# Patient Record
Sex: MALE | Race: WHITE | NOT HISPANIC OR LATINO | Employment: FULL TIME | URBAN - METROPOLITAN AREA
[De-identification: names, ages, dates, MRNs, and addresses within clinical notes are randomized per-mention and may not be internally consistent; named-entity substitution may affect disease eponyms.]

---

## 2019-03-14 ENCOUNTER — APPOINTMENT (OUTPATIENT)
Dept: CT IMAGING | Facility: HOSPITAL | Age: 44
End: 2019-03-14

## 2019-03-14 ENCOUNTER — HOSPITAL ENCOUNTER (OUTPATIENT)
Facility: HOSPITAL | Age: 44
Setting detail: OBSERVATION
Discharge: HOME | End: 2019-03-16
Attending: EMERGENCY MEDICINE | Admitting: INTERNAL MEDICINE

## 2019-03-14 DIAGNOSIS — S37.012A HEMATOMA OF LEFT KIDNEY, INITIAL ENCOUNTER: Primary | ICD-10-CM

## 2019-03-14 PROBLEM — R03.0 ELEVATED BP WITHOUT DIAGNOSIS OF HYPERTENSION: Status: ACTIVE | Noted: 2019-03-14

## 2019-03-14 PROBLEM — N17.9 AKI (ACUTE KIDNEY INJURY): Status: ACTIVE | Noted: 2019-03-14

## 2019-03-14 LAB
ABO GROUP BLD: NORMAL
ALBUMIN SERPL-MCNC: 4.7 G/DL (ref 3.5–5.2)
ALBUMIN/GLOB SERPL: 1.7 G/DL
ALP SERPL-CCNC: 79 U/L (ref 39–117)
ALT SERPL W P-5'-P-CCNC: 28 U/L (ref 1–41)
ANION GAP SERPL CALCULATED.3IONS-SCNC: 13.6 MMOL/L
ANION GAP SERPL CALCULATED.3IONS-SCNC: 15.2 MMOL/L
AST SERPL-CCNC: 20 U/L (ref 1–40)
BASOPHILS # BLD AUTO: 0.05 10*3/MM3 (ref 0–0.2)
BASOPHILS NFR BLD AUTO: 0.5 % (ref 0–1.5)
BILIRUB SERPL-MCNC: 0.4 MG/DL (ref 0.1–1.2)
BILIRUB UR QL STRIP: NEGATIVE
BLD GP AB SCN SERPL QL: NEGATIVE
BUN BLD-MCNC: 34 MG/DL (ref 6–20)
BUN BLD-MCNC: 37 MG/DL (ref 6–20)
BUN/CREAT SERPL: 24.1 (ref 7–25)
BUN/CREAT SERPL: 26.1 (ref 7–25)
CALCIUM SPEC-SCNC: 9.1 MG/DL (ref 8.6–10.5)
CALCIUM SPEC-SCNC: 9.6 MG/DL (ref 8.6–10.5)
CHLORIDE SERPL-SCNC: 100 MMOL/L (ref 98–107)
CHLORIDE SERPL-SCNC: 99 MMOL/L (ref 98–107)
CLARITY UR: CLEAR
CO2 SERPL-SCNC: 22.4 MMOL/L (ref 22–29)
CO2 SERPL-SCNC: 23.8 MMOL/L (ref 22–29)
COLOR UR: YELLOW
CREAT BLD-MCNC: 1.41 MG/DL (ref 0.76–1.27)
CREAT BLD-MCNC: 1.42 MG/DL (ref 0.76–1.27)
DEPRECATED RDW RBC AUTO: 37.7 FL (ref 37–54)
DEPRECATED RDW RBC AUTO: 38.4 FL (ref 37–54)
EOSINOPHIL # BLD AUTO: 0.19 10*3/MM3 (ref 0–0.4)
EOSINOPHIL NFR BLD AUTO: 1.9 % (ref 0.3–6.2)
ERYTHROCYTE [DISTWIDTH] IN BLOOD BY AUTOMATED COUNT: 12.5 % (ref 12.3–15.4)
ERYTHROCYTE [DISTWIDTH] IN BLOOD BY AUTOMATED COUNT: 12.6 % (ref 12.3–15.4)
GFR SERPL CREATININE-BSD FRML MDRD: 54 ML/MIN/1.73
GFR SERPL CREATININE-BSD FRML MDRD: 54 ML/MIN/1.73
GLOBULIN UR ELPH-MCNC: 2.7 GM/DL
GLUCOSE BLD-MCNC: 109 MG/DL (ref 65–99)
GLUCOSE BLD-MCNC: 112 MG/DL (ref 65–99)
GLUCOSE UR STRIP-MCNC: NEGATIVE MG/DL
HCT VFR BLD AUTO: 42.6 % (ref 37.5–51)
HCT VFR BLD AUTO: 45.1 % (ref 37.5–51)
HGB BLD-MCNC: 14.5 G/DL (ref 13–17.7)
HGB BLD-MCNC: 15.4 G/DL (ref 13–17.7)
HGB UR QL STRIP.AUTO: NEGATIVE
HOLD SPECIMEN: NORMAL
HOLD SPECIMEN: NORMAL
IMM GRANULOCYTES # BLD AUTO: 0.1 10*3/MM3 (ref 0–0.05)
IMM GRANULOCYTES NFR BLD AUTO: 1 % (ref 0–0.5)
KETONES UR QL STRIP: NEGATIVE
LEUKOCYTE ESTERASE UR QL STRIP.AUTO: NEGATIVE
LYMPHOCYTES # BLD AUTO: 1.24 10*3/MM3 (ref 0.7–3.1)
LYMPHOCYTES NFR BLD AUTO: 12.5 % (ref 19.6–45.3)
MCH RBC QN AUTO: 28.2 PG (ref 26.6–33)
MCH RBC QN AUTO: 28.4 PG (ref 26.6–33)
MCHC RBC AUTO-ENTMCNC: 34 G/DL (ref 31.5–35.7)
MCHC RBC AUTO-ENTMCNC: 34.1 G/DL (ref 31.5–35.7)
MCV RBC AUTO: 82.6 FL (ref 79–97)
MCV RBC AUTO: 83.5 FL (ref 79–97)
MONOCYTES # BLD AUTO: 0.79 10*3/MM3 (ref 0.1–0.9)
MONOCYTES NFR BLD AUTO: 7.9 % (ref 5–12)
NEUTROPHILS # BLD AUTO: 7.57 10*3/MM3 (ref 1.4–7)
NEUTROPHILS NFR BLD AUTO: 76.2 % (ref 42.7–76)
NITRITE UR QL STRIP: NEGATIVE
NRBC BLD AUTO-RTO: 0 /100 WBC (ref 0–0)
PH UR STRIP.AUTO: 6 [PH] (ref 5–8)
PLATELET # BLD AUTO: 185 10*3/MM3 (ref 140–450)
PLATELET # BLD AUTO: 224 10*3/MM3 (ref 140–450)
PMV BLD AUTO: 8.8 FL (ref 6–12)
PMV BLD AUTO: 9 FL (ref 6–12)
POTASSIUM BLD-SCNC: 4 MMOL/L (ref 3.5–5.2)
POTASSIUM BLD-SCNC: 4.3 MMOL/L (ref 3.5–5.2)
PROT SERPL-MCNC: 7.4 G/DL (ref 6–8.5)
PROT UR QL STRIP: NEGATIVE
RBC # BLD AUTO: 5.1 10*6/MM3 (ref 4.14–5.8)
RBC # BLD AUTO: 5.46 10*6/MM3 (ref 4.14–5.8)
RH BLD: POSITIVE
SODIUM BLD-SCNC: 136 MMOL/L (ref 136–145)
SODIUM BLD-SCNC: 138 MMOL/L (ref 136–145)
SP GR UR STRIP: 1.02 (ref 1–1.03)
T&S EXPIRATION DATE: NORMAL
UROBILINOGEN UR QL STRIP: NORMAL
WBC NRBC COR # BLD: 7.71 10*3/MM3 (ref 3.4–10.8)
WBC NRBC COR # BLD: 9.94 10*3/MM3 (ref 3.4–10.8)
WHOLE BLOOD HOLD SPECIMEN: NORMAL
WHOLE BLOOD HOLD SPECIMEN: NORMAL

## 2019-03-14 PROCEDURE — G0378 HOSPITAL OBSERVATION PER HR: HCPCS

## 2019-03-14 PROCEDURE — 86900 BLOOD TYPING SEROLOGIC ABO: CPT | Performed by: EMERGENCY MEDICINE

## 2019-03-14 PROCEDURE — 74160 CT ABDOMEN W/CONTRAST: CPT

## 2019-03-14 PROCEDURE — 74176 CT ABD & PELVIS W/O CONTRAST: CPT

## 2019-03-14 PROCEDURE — 25010000002 ONDANSETRON PER 1 MG: Performed by: EMERGENCY MEDICINE

## 2019-03-14 PROCEDURE — 25010000002 IOPAMIDOL 61 % SOLUTION: Performed by: HOSPITALIST

## 2019-03-14 PROCEDURE — 96361 HYDRATE IV INFUSION ADD-ON: CPT

## 2019-03-14 PROCEDURE — 81003 URINALYSIS AUTO W/O SCOPE: CPT | Performed by: EMERGENCY MEDICINE

## 2019-03-14 PROCEDURE — 80048 BASIC METABOLIC PNL TOTAL CA: CPT | Performed by: HOSPITALIST

## 2019-03-14 PROCEDURE — 85025 COMPLETE CBC W/AUTO DIFF WBC: CPT | Performed by: EMERGENCY MEDICINE

## 2019-03-14 PROCEDURE — 25010000002 HYDROMORPHONE PER 4 MG: Performed by: NURSE PRACTITIONER

## 2019-03-14 PROCEDURE — 80053 COMPREHEN METABOLIC PANEL: CPT | Performed by: EMERGENCY MEDICINE

## 2019-03-14 PROCEDURE — 85027 COMPLETE CBC AUTOMATED: CPT | Performed by: HOSPITALIST

## 2019-03-14 PROCEDURE — 86850 RBC ANTIBODY SCREEN: CPT | Performed by: EMERGENCY MEDICINE

## 2019-03-14 PROCEDURE — 96374 THER/PROPH/DIAG INJ IV PUSH: CPT

## 2019-03-14 PROCEDURE — 86901 BLOOD TYPING SEROLOGIC RH(D): CPT | Performed by: EMERGENCY MEDICINE

## 2019-03-14 PROCEDURE — 36415 COLL VENOUS BLD VENIPUNCTURE: CPT | Performed by: HOSPITALIST

## 2019-03-14 PROCEDURE — 99284 EMERGENCY DEPT VISIT MOD MDM: CPT

## 2019-03-14 PROCEDURE — 96376 TX/PRO/DX INJ SAME DRUG ADON: CPT

## 2019-03-14 PROCEDURE — 96375 TX/PRO/DX INJ NEW DRUG ADDON: CPT

## 2019-03-14 PROCEDURE — 25010000002 KETOROLAC TROMETHAMINE PER 15 MG: Performed by: EMERGENCY MEDICINE

## 2019-03-14 PROCEDURE — 25010000002 HYDROMORPHONE PER 4 MG: Performed by: EMERGENCY MEDICINE

## 2019-03-14 RX ORDER — SODIUM CHLORIDE 9 MG/ML
100 INJECTION, SOLUTION INTRAVENOUS CONTINUOUS
Status: DISCONTINUED | OUTPATIENT
Start: 2019-03-14 | End: 2019-03-15

## 2019-03-14 RX ORDER — BUSPIRONE HYDROCHLORIDE 10 MG/1
10 TABLET ORAL EVERY 12 HOURS
Status: DISCONTINUED | OUTPATIENT
Start: 2019-03-14 | End: 2019-03-16 | Stop reason: HOSPADM

## 2019-03-14 RX ORDER — NITROGLYCERIN 0.4 MG/1
0.4 TABLET SUBLINGUAL
Status: DISCONTINUED | OUTPATIENT
Start: 2019-03-14 | End: 2019-03-16 | Stop reason: HOSPADM

## 2019-03-14 RX ORDER — ONDANSETRON 2 MG/ML
4 INJECTION INTRAMUSCULAR; INTRAVENOUS EVERY 4 HOURS PRN
Status: DISCONTINUED | OUTPATIENT
Start: 2019-03-14 | End: 2019-03-16 | Stop reason: HOSPADM

## 2019-03-14 RX ORDER — FLUOXETINE HYDROCHLORIDE 20 MG/1
40 CAPSULE ORAL DAILY
Status: DISCONTINUED | OUTPATIENT
Start: 2019-03-14 | End: 2019-03-16 | Stop reason: HOSPADM

## 2019-03-14 RX ORDER — NALOXONE HCL 0.4 MG/ML
0.4 VIAL (ML) INJECTION
Status: DISCONTINUED | OUTPATIENT
Start: 2019-03-14 | End: 2019-03-16 | Stop reason: HOSPADM

## 2019-03-14 RX ORDER — ONDANSETRON 2 MG/ML
4 INJECTION INTRAMUSCULAR; INTRAVENOUS ONCE
Status: COMPLETED | OUTPATIENT
Start: 2019-03-14 | End: 2019-03-14

## 2019-03-14 RX ORDER — FLUOXETINE HYDROCHLORIDE 40 MG/1
40 CAPSULE ORAL DAILY
COMMUNITY

## 2019-03-14 RX ORDER — HYDROMORPHONE HYDROCHLORIDE 1 MG/ML
0.5 INJECTION, SOLUTION INTRAMUSCULAR; INTRAVENOUS; SUBCUTANEOUS ONCE
Status: COMPLETED | OUTPATIENT
Start: 2019-03-14 | End: 2019-03-14

## 2019-03-14 RX ORDER — SODIUM CHLORIDE 0.9 % (FLUSH) 0.9 %
10 SYRINGE (ML) INJECTION AS NEEDED
Status: DISCONTINUED | OUTPATIENT
Start: 2019-03-14 | End: 2019-03-16 | Stop reason: HOSPADM

## 2019-03-14 RX ORDER — BUPROPION HYDROCHLORIDE 75 MG/1
150 TABLET ORAL DAILY
Status: DISCONTINUED | OUTPATIENT
Start: 2019-03-14 | End: 2019-03-16 | Stop reason: HOSPADM

## 2019-03-14 RX ORDER — HYDROCODONE BITARTRATE AND ACETAMINOPHEN 7.5; 325 MG/1; MG/1
1 TABLET ORAL EVERY 4 HOURS PRN
Status: DISCONTINUED | OUTPATIENT
Start: 2019-03-14 | End: 2019-03-15

## 2019-03-14 RX ORDER — SODIUM CHLORIDE 0.9 % (FLUSH) 0.9 %
3-10 SYRINGE (ML) INJECTION AS NEEDED
Status: DISCONTINUED | OUTPATIENT
Start: 2019-03-14 | End: 2019-03-16 | Stop reason: HOSPADM

## 2019-03-14 RX ORDER — SODIUM CHLORIDE 0.9 % (FLUSH) 0.9 %
3 SYRINGE (ML) INJECTION EVERY 12 HOURS SCHEDULED
Status: DISCONTINUED | OUTPATIENT
Start: 2019-03-14 | End: 2019-03-16 | Stop reason: HOSPADM

## 2019-03-14 RX ORDER — HYDROMORPHONE HYDROCHLORIDE 1 MG/ML
0.5 INJECTION, SOLUTION INTRAMUSCULAR; INTRAVENOUS; SUBCUTANEOUS
Status: DISCONTINUED | OUTPATIENT
Start: 2019-03-14 | End: 2019-03-16 | Stop reason: HOSPADM

## 2019-03-14 RX ORDER — KETOROLAC TROMETHAMINE 30 MG/ML
30 INJECTION, SOLUTION INTRAMUSCULAR; INTRAVENOUS ONCE
Status: COMPLETED | OUTPATIENT
Start: 2019-03-14 | End: 2019-03-14

## 2019-03-14 RX ORDER — ACETAMINOPHEN 325 MG/1
650 TABLET ORAL EVERY 6 HOURS PRN
Status: DISCONTINUED | OUTPATIENT
Start: 2019-03-14 | End: 2019-03-16 | Stop reason: HOSPADM

## 2019-03-14 RX ADMIN — FLUOXETINE HYDROCHLORIDE 40 MG: 20 CAPSULE ORAL at 15:22

## 2019-03-14 RX ADMIN — HYDROMORPHONE HYDROCHLORIDE 0.5 MG: 1 INJECTION, SOLUTION INTRAMUSCULAR; INTRAVENOUS; SUBCUTANEOUS at 06:20

## 2019-03-14 RX ADMIN — HYDROMORPHONE HYDROCHLORIDE 0.5 MG: 1 INJECTION, SOLUTION INTRAMUSCULAR; INTRAVENOUS; SUBCUTANEOUS at 10:05

## 2019-03-14 RX ADMIN — BUSPIRONE HYDROCHLORIDE 10 MG: 10 TABLET ORAL at 22:03

## 2019-03-14 RX ADMIN — SODIUM CHLORIDE 100 ML/HR: 9 INJECTION, SOLUTION INTRAVENOUS at 20:17

## 2019-03-14 RX ADMIN — HYDROMORPHONE HYDROCHLORIDE 0.5 MG: 1 INJECTION, SOLUTION INTRAMUSCULAR; INTRAVENOUS; SUBCUTANEOUS at 15:20

## 2019-03-14 RX ADMIN — SODIUM CHLORIDE 100 ML/HR: 9 INJECTION, SOLUTION INTRAVENOUS at 10:05

## 2019-03-14 RX ADMIN — KETOROLAC TROMETHAMINE 30 MG: 30 INJECTION, SOLUTION INTRAMUSCULAR; INTRAVENOUS at 05:21

## 2019-03-14 RX ADMIN — HYDROMORPHONE HYDROCHLORIDE 0.5 MG: 1 INJECTION, SOLUTION INTRAMUSCULAR; INTRAVENOUS; SUBCUTANEOUS at 19:25

## 2019-03-14 RX ADMIN — HYDROMORPHONE HYDROCHLORIDE 0.5 MG: 1 INJECTION, SOLUTION INTRAMUSCULAR; INTRAVENOUS; SUBCUTANEOUS at 13:24

## 2019-03-14 RX ADMIN — BUPROPION HYDROCHLORIDE 150 MG: 75 TABLET, FILM COATED ORAL at 15:22

## 2019-03-14 RX ADMIN — IOPAMIDOL 85 ML: 612 INJECTION, SOLUTION INTRAVENOUS at 07:26

## 2019-03-14 RX ADMIN — ONDANSETRON 4 MG: 2 INJECTION INTRAMUSCULAR; INTRAVENOUS at 05:20

## 2019-03-14 RX ADMIN — SODIUM CHLORIDE, PRESERVATIVE FREE 3 ML: 5 INJECTION INTRAVENOUS at 21:00

## 2019-03-14 RX ADMIN — SODIUM CHLORIDE, PRESERVATIVE FREE 10 ML: 5 INJECTION INTRAVENOUS at 10:07

## 2019-03-14 RX ADMIN — HYDROMORPHONE HYDROCHLORIDE 0.5 MG: 1 INJECTION, SOLUTION INTRAMUSCULAR; INTRAVENOUS; SUBCUTANEOUS at 22:53

## 2019-03-14 NOTE — ED NOTES
Attempted to call report to 6E. RN not available at this time.     Shante Neumann, ESA  03/14/19 0662

## 2019-03-14 NOTE — ED NOTES
"Pt c/o LLQ ABD pain since 0100 this morning. Denies any vomiting or diarrhea. C/o some nausea. Denies any urinary symptoms, but states hx of kidney stones and \"this feels similar.\" Denies any GI hx. States took a Norco when the pain started without relief. States was playing ice hockey and \"I got hit pretty hard\" around 0000 tonight. States \"I got the wind knocked out of me.\" Denies hitting head or taking blood thinners. Denies any other pain or injury.    VSS, NAD, A&O x4. Respirations even and unlabored. Denies any other acute complaints. Call light in reach. Will continue to monitor. MD to treat.     Shante Neumann, RN  03/14/19 9150       Shante Neumann, RN  03/14/19 0509    "

## 2019-03-14 NOTE — CONSULTS
FIRST UROLOGY CONSULT      Patient Identification:  NAME:  Navin Hare  Age:  43 y.o.   Sex:  male   :  1975   MRN:  [id number]       Chief complaint: Bleeding on the left kidney I have a history of kidney stones    History of present illness: 43-year-old white male history of stones on the right side was playing hockey last night fell over the boards and falling on his left side at first thought there was not much going on as far as pain became progressively severe was in the emergency room hypertensive with flank pain up to about 8-9 out of 10 with a CT scan confirming a subcapsular hematoma on noncontrasted study later films show that the hematoma was drained and made unchanged and classified as a class I he has had no recent stones his urine has been clear voiding without difficulty      Past medical history:  Past Medical History:   Diagnosis Date   • Kidney stones        Past surgical history:  Past Surgical History:   Procedure Laterality Date   • EAR TUBES         Allergies:  Patient has no known allergies.    Home medications:  Medications Prior to Admission   Medication Sig Dispense Refill Last Dose   • BUPROPION HCL PO Take 150 mg by mouth Daily.      • BUSPIRONE HCL PO Take 10 mg by mouth Every 12 (Twelve) Hours.      • FLUoxetine (PROzac) 40 MG capsule Take 40 mg by mouth Daily.           Hospital medications:    buPROPion 150 mg Oral Daily   busPIRone 10 mg Oral Q12H   FLUoxetine 40 mg Oral Daily   sodium chloride 3 mL Intravenous Q12H       sodium chloride 100 mL/hr Last Rate: 100 mL/hr (19 1005)     •  acetaminophen  •  HYDROcodone-acetaminophen  •  HYDROmorphone **AND** naloxone  •  magnesium hydroxide  •  nitroglycerin  •  ondansetron  •  [COMPLETED] Insert peripheral IV **AND** sodium chloride  •  sodium chloride    Family history:  History reviewed. No pertinent family history.    Social history:  Social History     Tobacco Use   • Smoking status: Never Smoker   •  Smokeless tobacco: Never Used   Substance Use Topics   • Alcohol use: Yes     Alcohol/week: 1.2 oz     Types: 2 Cans of beer per week   • Drug use: No       Review of systems:    Negative 12-system ROS except for the following: No fever or chills no gross hematuria      Objective:  TMax 24 hours:   Temp (24hrs), Av.9 °F (36.1 °C), Min:96.7 °F (35.9 °C), Max:97.1 °F (36.2 °C)      Vitals Ranges:   Temp:  [96.7 °F (35.9 °C)-97.1 °F (36.2 °C)] 97.1 °F (36.2 °C)  Heart Rate:  [73-90] 80  Resp:  [16] 16  BP: (140-168)/() 165/95    Intake/Output Last 3 shifts:  No intake/output data recorded.     Physical Exam:       General Appearance:    Alert, cooperative, in no acute distress   Head:    Normocephalic, without obvious abnormality, atraumatic   Eyes:           conjunctivae and corneas clear   Ears:    Normal external inspection   Throat:   No oral lesions, oral mucosa moist   Neck:   Supple, no LAD, trachea midline   Back:     No CVA tenderness   Lungs:     Respirations unlabored, symmetric excursion    Heart:    RRR, intact peripheral pulses   Abdomen:     Soft, NDNT, no masses, no guarding  LT CVAT   :     HERBERTH:  Extremities:     No edema, no deformity   Skin:   No bleeding, bruising or rashes   Neuro/Psych:   Orientation intact, mood/affect pleasant, no focal findings       Results review:   I reviewed the patient's new clinical results.    Data review:  Lab Results (last 24 hours)     Procedure Component Value Units Date/Time    CBC (No Diff) [[id number]]  (Normal) Collected:  19    Specimen:  Blood Updated:  19     WBC 7.71 10*3/mm3      RBC 5.10 10*6/mm3      Hemoglobin 14.5 g/dL      Hematocrit 42.6 %      MCV 83.5 fL      MCH 28.4 pg      MCHC 34.0 g/dL      RDW 12.6 %      RDW-SD 38.4 fl      MPV 8.8 fL      Platelets 185 10*3/mm3     Basic Metabolic Panel [[id number]] Collected:  19 120    Specimen:  Blood Updated:  19 1212    [place] Draw [[id number]] Collected:   03/14/19 0515    Specimen:  Blood Updated:  03/14/19 0616    Narrative:       The following orders were created for panel order [place] Draw.  Procedure                               Abnormality         Status                     ---------                               -----------         ------                     Light Blue Top[[id number]]                                    Final result               Green Top (Gel)[[id number]]                                   Final result               Lavender Top[[id number]]                                      Final result               Gold Top - SST[[id number]]                                   Final result                 Please view results for these tests on the individual orders.    Light Blue Top [[id number]] Collected:  03/14/19 0515    Specimen:  Blood Updated:  03/14/19 0616     Extra Tube hold for add-on     Comment: Auto resulted       Green Top (Gel) [[id number]] Collected:  03/14/19 0515    Specimen:  Blood Updated:  03/14/19 0616     Extra Tube Hold for add-ons.     Comment: Auto resulted.       Lavender Top [[id number]] Collected:  03/14/19 0515    Specimen:  Blood Updated:  03/14/19 0616     Extra Tube hold for add-on     Comment: Auto resulted       Gold Top - SST [[id number]] Collected:  03/14/19 0515    Specimen:  Blood Updated:  03/14/19 0616     Extra Tube Hold for add-ons.     Comment: Auto resulted.       Comprehensive Metabolic Panel [[id number]]  (Abnormal) Collected:  03/14/19 0515    Specimen:  Blood Updated:  03/14/19 0549     Glucose 109 mg/dL      BUN 37 mg/dL      Creatinine 1.42 mg/dL      Sodium 138 mmol/L      Potassium 4.0 mmol/L      Chloride 99 mmol/L      CO2 23.8 mmol/L      Calcium 9.6 mg/dL      Total Protein 7.4 g/dL      Albumin 4.70 g/dL      ALT (SGPT) 28 U/L      AST (SGOT) 20 U/L      Alkaline Phosphatase 79 U/L      Total Bilirubin 0.4 mg/dL      eGFR Non African Amer 54 mL/min/1.73      Globulin 2.7 gm/dL      A/G Ratio 1.7 g/dL       BUN/Creatinine Ratio 26.1     Anion Gap 15.2 mmol/L     Narrative:       GFR Normal >60  Chronic Kidney Disease <60  Kidney Failure <15    Urinalysis With Microscopic If Indicated (No Culture) - Urine, Clean Catch [[id number]]  (Normal) Collected:  03/14/19 0516    Specimen:  Urine, Clean Catch Updated:  03/14/19 0528     Color, UA Yellow     Appearance, UA Clear     pH, UA 6.0     Specific Gravity, UA 1.024     Glucose, UA Negative     Ketones, UA Negative     Bilirubin, UA Negative     Blood, UA Negative     Protein, UA Negative     Leuk Esterase, UA Negative     Nitrite, UA Negative     Urobilinogen, UA 0.2 E.U./dL    Narrative:       Urine microscopic not indicated.    CBC & Differential [[id number]] Collected:  03/14/19 0515    Specimen:  Blood Updated:  03/14/19 0526    Narrative:       The following orders were created for panel order CBC & Differential.  Procedure                               Abnormality         Status                     ---------                               -----------         ------                     CBC Auto Differential[[id number]]         Abnormal            Final result                 Please view results for these tests on the individual orders.    CBC Auto Differential [[id number]]  (Abnormal) Collected:  03/14/19 0515    Specimen:  Blood Updated:  03/14/19 0526     WBC 9.94 10*3/mm3      RBC 5.46 10*6/mm3      Hemoglobin 15.4 g/dL      Hematocrit 45.1 %      MCV 82.6 fL      MCH 28.2 pg      MCHC 34.1 g/dL      RDW 12.5 %      RDW-SD 37.7 fl      MPV 9.0 fL      Platelets 224 10*3/mm3      Neutrophil % 76.2 %      Lymphocyte % 12.5 %      Monocyte % 7.9 %      Eosinophil % 1.9 %      Basophil % 0.5 %      Immature Grans % 1.0 %      Neutrophils, Absolute 7.57 10*3/mm3      Lymphocytes, Absolute 1.24 10*3/mm3      Monocytes, Absolute 0.79 10*3/mm3      Eosinophils, Absolute 0.19 10*3/mm3      Basophils, Absolute 0.05 10*3/mm3      Immature Grans, Absolute 0.10 10*3/mm3      nRBC 0.0  /100 WBC            Imaging:  Imaging Results (last 24 hours)     Procedure Component Value Units Date/Time    CT Abdomen With Contrast [[id number]] Collected:  03/14/19 0743     Updated:  03/14/19 0800    Narrative:       CT ABDOMEN W CONTRAST-     Radiation dose reduction techniques were utilized, including automated  exposure control and exposure modulation based on body size.     Clinical: Blunt trauma left abdomen with left subcapsular renal  hematoma, follow-up     FINDINGS: There is a left renal subcapsular hematoma again demonstrated.  It extends from the superior to the inferior pole along the posterior  border and measures 6.4 cm transverse and 2.7 cm AP. Size is similar to  the previous examination. There is a small amount of left-sided  perinephric fluid with fat stranding similar to the previous  examination. Slightly diminished left nephrogram. There is no indication  of active hemorrhage. The left adrenal gland is typical in appearance.  No abnormality of the spleen or left lower ribs. The left lung base is  typical in appearance.     There is a tiny 2-3 mm hepatic cyst within the right lobe of the liver.  The liver is otherwise normal in appearance. The gallbladder is  satisfactory in appearance. The pancreas is normal. There is a sizable  duodenal diverticulum adjacent to the pancreatic head, measuring 4 cm.  Stomach is largely collapsed overall contour within normal limits. The  right adrenal gland is normal. The caliber of the aorta is normal. 9 mm  right renal cortical cyst. The right kidney is otherwise satisfactory in  appearance. The appendix and visualized bowel are within normal limits.  Periumbilical hernia noted, it contains only mesenteric fat.     CONCLUSION: Size of the left subcapsular renal hematoma is similar to  the earlier examination performed at 0528 hours. Perinephric fluid/fat  stranding is likewise stable. Slightly delayed left nephrogram compared  to the right. No abnormality  of the spleen, left lung base or left lower  ribs.        This report was finalized on 3/14/2019 7:56 AM by Dr. Demetri Burgess M.D.       CT Abdomen Pelvis Without Contrast [[id number]] Collected:  03/14/19 0544     Updated:  03/14/19 0553    Narrative:       NONCONTRAST CT SCANS ABDOMEN AND PELVIS     HISTORY: llq pain     COMPARISON: 5/22/2016.     TECHNIQUE:Radiation dose reduction techniques were utilized, including  automated exposure control and exposure modulation based on body size.   Axial images were obtained from the lung bases to the symphysis pubis  without oral or IV contrast per request.      FINDINGS:  There is a stable 4 mm noncalcified right lower lobe nodule  laterally on image 12. This most likely reflects a tiny granuloma,  particularly given its stability for nearly 2 years. There is no  nephrolithiasis or hydronephrosis. There is a crescentic configured area  of slightly increased density along the posterior aspect of the left  kidney compatible with a subcapsular hematoma. It measures up to  approximately 2.5 cm in thickness and displaces the cortex slightly  anteriorly. There is a mild amount of hemorrhage and inflammation in the  adjacent pararenal fat. Remaining solid organs are otherwise  unremarkable without benefit of IV contrast. Stomach distended with a  large amount of retained fluid and particulate material. Stable large  duodenal diverticulum. Normal appendix. The GI tract not opacified for  assessment but non obstructive in appearance. Encompassed aorta is  non-aneurysmal. There is a small fat containing umbilical hernia without  associated inflammation. Stable prostate gland calcifications. Osseous  structures intact.          Impression:       1.  2.5 cm thickness left subcapsular hematoma with mild hemorrhage and  inflammation in the adjacent pararenal fat. Follow-up recommended to  ensure resolution.  2. Stable 4 mm right lower lobe nodule most consistent with  benign  granuloma              This report was finalized on 3/14/2019 5:50 AM by Donald Dinh M.D.                Assessment:       Renal hematoma, left    NAIF (acute kidney injury) (CMS/HCC)    Elevated BP without diagnosis of hypertension    Class I renal laceration left side with subcapsular hematoma small stable on sequential CT scans    Plan:     Discussed findings with the patient will follow serial H&H's over the next 24 hours bedrest also and no contact activity for a month with a follow-up CT scan if things remain stable in approximately 2 weeks I can schedule this at the office discussed the risks of rebleed anticoagulation therapy sequelae of this including high blood pressure patient understands and agrees    Richmond Younger MD  03/14/19  12:45 PM

## 2019-03-14 NOTE — H&P
Patient Name:  Navin Hare  YOB: 1975  MRN:  [id number]  Admit Date:  3/14/2019  Patient Care Team:  Provider, No Known as PCP - General      Subjective   History Present Illness     Chief Complaint   Patient presents with   • Abdominal Pain     History of Present Illness  Mr. Hare is a 43 y.o. non-smoker with a history of kidney stones, ADHD, anxiety that presents to [facility] complaining of left flank pain and left lower quadrant pain.  Patient was playing in ice hockey game yesterday at midnight and fell while climbing over the ice rink wall landing on his left side.  He started experiencing sharp unrelenting pain in his left lower quadrant and left flank.  Associated with nausea but no emesis, dysuria, bloody urine, fever.  Patient cannot identify any aggravating or alleviating factors for his pain.  He denies illicit drug use, tobacco use, or frequent alcohol use.  He does not have a history of hypertension or kidney disease.  Urology was consulted by ER provider.  A has been asked to admit the patient for the severity of traumatic injury with renal hematoma and acute kidney injury.    Review of Systems   Constitutional: Negative.  Negative for appetite change, chills, diaphoresis and fatigue.   HENT: Negative.  Negative for trouble swallowing.    Respiratory: Negative for choking.    Cardiovascular: Negative for chest pain.   Gastrointestinal: Positive for nausea. Negative for abdominal distention, abdominal pain, blood in stool, constipation, diarrhea and vomiting.        LLQ pain, left flank pain   Genitourinary: Negative for decreased urine volume, difficulty urinating, dysuria, frequency and hematuria.   Musculoskeletal: Negative for back pain.   Skin: Negative for pallor.   Allergic/Immunologic: Negative for immunocompromised state.   Neurological: Negative for dizziness.   Hematological: Does not bruise/bleed easily.   Psychiatric/Behavioral: Negative.          Personal History     Past Medical History:   Diagnosis Date   • Kidney stones      Past Surgical History:   Procedure Laterality Date   • EAR TUBES       History reviewed. No pertinent family history.  Social History     Tobacco Use   • Smoking status: Never Smoker   • Smokeless tobacco: Never Used   Substance Use Topics   • Alcohol use: Yes     Alcohol/week: 1.2 oz     Types: 2 Cans of beer per week   • Drug use: No     Medications Prior to Admission   Medication Sig Dispense Refill Last Dose   • BUPROPION HCL PO Take  by mouth Daily.      • BUSPIRONE HCL PO Take  by mouth Daily.        Allergies:  No Known Allergies    Objective    Objective     Vital Signs  Temp:  [96.7 °F (35.9 °C)-97.1 °F (36.2 °C)] 97.1 °F (36.2 °C)  Heart Rate:  [73-90] 80  Resp:  [16] 16  BP: (140-168)/() 165/95  SpO2:  [93 %-96 %] 95 %  on   ;   Device (Oxygen Therapy): room air  Body mass index is 29.43 kg/m².    Physical Exam   Constitutional: He is oriented to person, place, and time. He appears well-developed and well-nourished. No distress.   Healthy appearing male, appears in moderate discomfort    HENT:   Head: Normocephalic and atraumatic.   Mouth/Throat: Oropharynx is clear and moist.   Eyes: Conjunctivae and EOM are normal.   Neck: Normal range of motion. No tracheal deviation present.   Cardiovascular: Normal rate, regular rhythm, normal heart sounds and intact distal pulses.   Pulmonary/Chest: Effort normal and breath sounds normal. No respiratory distress.   Abdominal: Soft. Normal appearance and bowel sounds are normal. He exhibits no distension and no mass. There is tenderness in the left lower quadrant. There is no rebound, no guarding, no tenderness at McBurney's point and negative Sheikh's sign.   Genitourinary:   Genitourinary Comments:     Musculoskeletal: He exhibits no edema, tenderness or deformity.   Neurological: He is alert and oriented to person, place, and time. No cranial nerve deficit.   Skin: Skin is  warm and dry. Capillary refill takes less than 2 seconds. No rash noted. No erythema. No pallor.   Psychiatric: He has a normal mood and affect. Judgment normal.   Nursing note and vitals reviewed.      Results Review:  I reviewed the patient's new clinical results.  I reviewed the patient's new imaging results and agree with the interpretation.  I reviewed the patient's other test results and agree with the interpretation    Results from last 7 days   Lab Units 03/14/19  0515   WBC 10*3/mm3 9.94   HEMOGLOBIN g/dL 15.4   HEMATOCRIT % 45.1   PLATELETS 10*3/mm3 224     Results from last 7 days   Lab Units 03/14/19  0515   SODIUM mmol/L 138   POTASSIUM mmol/L 4.0   CHLORIDE mmol/L 99   CO2 mmol/L 23.8   BUN mg/dL 37*   CREATININE mg/dL 1.42*   CALCIUM mg/dL 9.6   BILIRUBIN mg/dL 0.4   ALK PHOS U/L 79   ALT (SGPT) U/L 28   AST (SGOT) U/L 20   GLUCOSE mg/dL 109*     Brief Urine Lab Results  (Last result in the past 365 days)      Color   Clarity   Blood   Leuk Est   Nitrite   Protein   CREAT   Urine HCG        03/14/19 0516 Yellow Clear Negative Negative Negative Negative             Ct Abdomen Pelvis Without Contrast    Result Date: 3/14/2019  1.  2.5 cm thickness left subcapsular hematoma with mild hemorrhage and inflammation in the adjacent pararenal fat. Follow-up recommended to ensure resolution. 2. Stable 4 mm right lower lobe nodule most consistent with benign granuloma     This report was finalized on 3/14/2019 5:50 AM by Donald Dinh M.D.              Imaging Results (last 24 hours)     Procedure Component Value Units Date/Time    CT Abdomen With Contrast [[id number]] Collected:  03/14/19 0743     Updated:  03/14/19 0800    Narrative:       CT ABDOMEN W CONTRAST-     Radiation dose reduction techniques were utilized, including automated  exposure control and exposure modulation based on body size.     Clinical: Blunt trauma left abdomen with left subcapsular renal  hematoma, follow-up     FINDINGS: There is a  left renal subcapsular hematoma again demonstrated.  It extends from the superior to the inferior pole along the posterior  border and measures 6.4 cm transverse and 2.7 cm AP. Size is similar to  the previous examination. There is a small amount of left-sided  perinephric fluid with fat stranding similar to the previous  examination. Slightly diminished left nephrogram. There is no indication  of active hemorrhage. The left adrenal gland is typical in appearance.  No abnormality of the spleen or left lower ribs. The left lung base is  typical in appearance.     There is a tiny 2-3 mm hepatic cyst within the right lobe of the liver.  The liver is otherwise normal in appearance. The gallbladder is  satisfactory in appearance. The pancreas is normal. There is a sizable  duodenal diverticulum adjacent to the pancreatic head, measuring 4 cm.  Stomach is largely collapsed overall contour within normal limits. The  right adrenal gland is normal. The caliber of the aorta is normal. 9 mm  right renal cortical cyst. The right kidney is otherwise satisfactory in  appearance. The appendix and visualized bowel are within normal limits.  Periumbilical hernia noted, it contains only mesenteric fat.     CONCLUSION: Size of the left subcapsular renal hematoma is similar to  the earlier examination performed at 0528 hours. Perinephric fluid/fat  stranding is likewise stable. Slightly delayed left nephrogram compared  to the right. No abnormality of the spleen, left lung base or left lower  ribs.        This report was finalized on 3/14/2019 7:56 AM by Dr. Demetri Burgess M.D.       CT Abdomen Pelvis Without Contrast [[id number]] Collected:  03/14/19 0544     Updated:  03/14/19 0553    Narrative:       NONCONTRAST CT SCANS ABDOMEN AND PELVIS     HISTORY: llq pain     COMPARISON: 5/22/2016.     TECHNIQUE:Radiation dose reduction techniques were utilized, including  automated exposure control and exposure modulation based on body size.    Axial images were obtained from the lung bases to the symphysis pubis  without oral or IV contrast per request.      FINDINGS:  There is a stable 4 mm noncalcified right lower lobe nodule  laterally on image 12. This most likely reflects a tiny granuloma,  particularly given its stability for nearly 2 years. There is no  nephrolithiasis or hydronephrosis. There is a crescentic configured area  of slightly increased density along the posterior aspect of the left  kidney compatible with a subcapsular hematoma. It measures up to  approximately 2.5 cm in thickness and displaces the cortex slightly  anteriorly. There is a mild amount of hemorrhage and inflammation in the  adjacent pararenal fat. Remaining solid organs are otherwise  unremarkable without benefit of IV contrast. Stomach distended with a  large amount of retained fluid and particulate material. Stable large  duodenal diverticulum. Normal appendix. The GI tract not opacified for  assessment but non obstructive in appearance. Encompassed aorta is  non-aneurysmal. There is a small fat containing umbilical hernia without  associated inflammation. Stable prostate gland calcifications. Osseous  structures intact.          Impression:       1.  2.5 cm thickness left subcapsular hematoma with mild hemorrhage and  inflammation in the adjacent pararenal fat. Follow-up recommended to  ensure resolution.  2. Stable 4 mm right lower lobe nodule most consistent with benign  granuloma              This report was finalized on 3/14/2019 5:50 AM by Donald Dinh M.D.                  No orders to display     Assessment/Plan   Assessment/Plan     Active Hospital Problems    Diagnosis Date Noted   • Renal hematoma, left [S37.012A] 03/14/2019   • NAIF (acute kidney injury) (CMS/HCC) [N17.9] 03/14/2019   • Elevated BP without diagnosis of hypertension [R03.0] 03/14/2019     Mr. Phipps is a 40-year-old male who presents with traumatic injury resulting in left subscapular  renal hematoma with mild inflammation and mild hemorrhage. Given severity of injury he needs inpatient status with telemetry monitoring. Urinalysis is unremarkable, hemoglobin within normal limits.  However, creatinine is elevated to 1.42 with BUN 37.  Patient's blood pressure was also elevated in the emergency room (168/110)  likely secondary to pain since it did improve following Dilaudid.     Left renal hematoma secondary to traumatic injury  · Admit to telemetry  · Pain control and analgesics  · Repeat CBC and BMP at noon today to monitor hemoglobin, renal function, electrolytes  · Urology has been consulted  · IVF with NS at 100 cc/h    NAIF  · Acute Kidney Injury from traumatic injury : Patient has acute renal failure, with rise in creatinine to 1.42 from previous baseline of 1.04  · - Avoid NSAIDS/nephrotoxic agents and renally dose medications  · - Monitor Ins/Outs, volume status, and electrolytes  · Repeat BMP today and in the morning.    · Continue IVF    Elevated blood pressure without hypertension  · Suspect secondary to pain   · Monitor BP.  If necessary we will add PRN antihypertensive agent  · He will need to follow-up with PCP for further monitoring      · I discussed the patients findings and my recommendations with patient and nursing staff.    VTE Prophylaxis - SCDs .  Code Status - Full code.       Delaney Reyes APRRO  [facility]  03/14/19  9:16 AM    Addendum:   I, Elías Berger MD, personally performed the services described in this documentation as scribed by the above named individual in my presence, and it is both accurate and complete.     I personally examined the patient and the physical findings as per above plus my exam revealed:    In NAD, pleasant and cooperative, not ill-appearing  HEENT unremarkable  Lungs CTAB  Heart is RRR without murmur  Abdomen is mildly TTP @ left flank without rebound or guarding  Extremities WWP, no edema, BCR <2 sec     I agree  with assessment and plan as per above with the addition of the following:  Await  eval  Continue to monitor BP, Cr, and Hgb  Repeat CT documents stability in appearance of hematoma      Elías Berger MD   3/14/2019  11:25 AM

## 2019-03-14 NOTE — ED PROVIDER NOTES
EMERGENCY DEPARTMENT ENCOUNTER    CHIEF COMPLAINT  Chief Complaint: Abdominal pain  History given by: patient   History limited by: n/a   Room Number: 13/13  PMD: Provider, No Known      HPI:  Pt is a 43 y.o. male who presents complaining of LLQ abd pain that began at 01:00. He also complains of associated nausea, but denies vomiting, fever, or urinary sx. Pt reports a hx of similar pain with prior kidney stones. Pt does state that he was playing ice hockey and was hit around 00:00 tonight.     Duration:  4 hours   Onset: sudden  Timing: constant   Location: LLQ  Radiation: none  Quality: pain  Intensity/Severity: moderate  Progression: unchanged   Associated Symptoms: nausea  Aggravating Factors: none  Alleviating Factors: none    PAST MEDICAL HISTORY  Active Ambulatory Problems     Diagnosis Date Noted   • No Active Ambulatory Problems     Resolved Ambulatory Problems     Diagnosis Date Noted   • No Resolved Ambulatory Problems     Past Medical History:   Diagnosis Date   • Kidney stones        PAST SURGICAL HISTORY  Past Surgical History:   Procedure Laterality Date   • EAR TUBES         FAMILY HISTORY  History reviewed. No pertinent family history.    SOCIAL HISTORY  Social History     Socioeconomic History   • Marital status:      Spouse name: Not on file   • Number of children: Not on file   • Years of education: Not on file   • Highest education level: Not on file   Social Needs   • Financial resource strain: Not on file   • Food insecurity - worry: Not on file   • Food insecurity - inability: Not on file   • Transportation needs - medical: Not on file   • Transportation needs - non-medical: Not on file   Occupational History   • Not on file   Tobacco Use   • Smoking status: Never Smoker   • Smokeless tobacco: Never Used   Substance and Sexual Activity   • Alcohol use: Yes     Alcohol/week: 1.2 oz     Types: 2 Cans of beer per week   • Drug use: No   • Sexual activity: Defer   Other Topics Concern    • Not on file   Social History Narrative   • Not on file       ALLERGIES  Patient has no known allergies.    REVIEW OF SYSTEMS  Review of Systems   Constitutional: Negative for activity change, appetite change and fever.   HENT: Negative for congestion and sore throat.    Eyes: Negative.    Respiratory: Negative for cough and shortness of breath.    Cardiovascular: Negative for chest pain and leg swelling.   Gastrointestinal: Positive for abdominal pain and nausea. Negative for diarrhea and vomiting.   Endocrine: Negative.    Genitourinary: Negative for decreased urine volume and dysuria.   Musculoskeletal: Negative for neck pain.   Skin: Negative for rash and wound.   Allergic/Immunologic: Negative.    Neurological: Negative for weakness, numbness and headaches.   Hematological: Negative.    Psychiatric/Behavioral: Negative.    All other systems reviewed and are negative.      PHYSICAL EXAM  ED Triage Vitals   Temp Heart Rate Resp BP SpO2   03/14/19 0451 03/14/19 0451 03/14/19 0451 03/14/19 0503 03/14/19 0451   96.7 °F (35.9 °C) 89 16 (!) 168/110 95 %      Temp src Heart Rate Source Patient Position BP Location FiO2 (%)   03/14/19 0451 03/14/19 0451 -- -- --   Tympanic Monitor          Physical Exam   Constitutional: He is oriented to person, place, and time. No distress.   HENT:   Head: Normocephalic and atraumatic.   Eyes: EOM are normal. Pupils are equal, round, and reactive to light.   Neck: Normal range of motion. Neck supple.   Cardiovascular: Normal rate, regular rhythm and normal heart sounds.   Pulmonary/Chest: Effort normal and breath sounds normal. No respiratory distress.   Abdominal: Soft. There is no tenderness. There is no rebound, no guarding and no CVA tenderness.   Musculoskeletal: Normal range of motion. He exhibits no edema.   Neurological: He is alert and oriented to person, place, and time. He has normal sensation and normal strength.   Skin: Skin is warm. He is diaphoretic.   Psychiatric:  Mood and affect normal.   Nursing note and vitals reviewed.      LAB RESULTS  Lab Results (last 24 hours)     Procedure Component Value Units Date/Time    CBC & Differential [[id number]] Collected:  03/14/19 0515    Specimen:  Blood Updated:  03/14/19 0526    Narrative:       The following orders were created for panel order CBC & Differential.  Procedure                               Abnormality         Status                     ---------                               -----------         ------                     CBC Auto Differential[[id number]]         Abnormal            Final result                 Please view results for these tests on the individual orders.    Comprehensive Metabolic Panel [[id number]]  (Abnormal) Collected:  03/14/19 0515    Specimen:  Blood Updated:  03/14/19 0549     Glucose 109 mg/dL      BUN 37 mg/dL      Creatinine 1.42 mg/dL      Sodium 138 mmol/L      Potassium 4.0 mmol/L      Chloride 99 mmol/L      CO2 23.8 mmol/L      Calcium 9.6 mg/dL      Total Protein 7.4 g/dL      Albumin 4.70 g/dL      ALT (SGPT) 28 U/L      AST (SGOT) 20 U/L      Alkaline Phosphatase 79 U/L      Total Bilirubin 0.4 mg/dL      eGFR Non African Amer 54 mL/min/1.73      Globulin 2.7 gm/dL      A/G Ratio 1.7 g/dL      BUN/Creatinine Ratio 26.1     Anion Gap 15.2 mmol/L     Narrative:       GFR Normal >60  Chronic Kidney Disease <60  Kidney Failure <15    CBC Auto Differential [[id number]]  (Abnormal) Collected:  03/14/19 0515    Specimen:  Blood Updated:  03/14/19 0526     WBC 9.94 10*3/mm3      RBC 5.46 10*6/mm3      Hemoglobin 15.4 g/dL      Hematocrit 45.1 %      MCV 82.6 fL      MCH 28.2 pg      MCHC 34.1 g/dL      RDW 12.5 %      RDW-SD 37.7 fl      MPV 9.0 fL      Platelets 224 10*3/mm3      Neutrophil % 76.2 %      Lymphocyte % 12.5 %      Monocyte % 7.9 %      Eosinophil % 1.9 %      Basophil % 0.5 %      Immature Grans % 1.0 %      Neutrophils, Absolute 7.57 10*3/mm3      Lymphocytes, Absolute 1.24 10*3/mm3       Monocytes, Absolute 0.79 10*3/mm3      Eosinophils, Absolute 0.19 10*3/mm3      Basophils, Absolute 0.05 10*3/mm3      Immature Grans, Absolute 0.10 10*3/mm3      nRBC 0.0 /100 WBC     Urinalysis With Microscopic If Indicated (No Culture) - Urine, Clean Catch [[id number]]  (Normal) Collected:  03/14/19 0516    Specimen:  Urine, Clean Catch Updated:  03/14/19 0528     Color, UA Yellow     Appearance, UA Clear     pH, UA 6.0     Specific Gravity, UA 1.024     Glucose, UA Negative     Ketones, UA Negative     Bilirubin, UA Negative     Blood, UA Negative     Protein, UA Negative     Leuk Esterase, UA Negative     Nitrite, UA Negative     Urobilinogen, UA 0.2 E.U./dL    Narrative:       Urine microscopic not indicated.          I ordered the above labs and reviewed the results    RADIOLOGY  CT Abdomen Pelvis Without Contrast   Final Result   1.  2.5 cm thickness left subcapsular hematoma with mild hemorrhage and   inflammation in the adjacent pararenal fat. Follow-up recommended to   ensure resolution.   2. Stable 4 mm right lower lobe nodule most consistent with benign   granuloma                   This report was finalized on 3/14/2019 5:50 AM by Donald Dinh M.D.               I ordered the above noted radiological studies. Interpreted by radiologist.  Reviewed by me in PACS.       PROCEDURES  Procedures      PROGRESS AND CONSULTS       05;10  BP- (!) 168/110 HR- 76 Temp- 96.7 °F (35.9 °C) (Tympanic) O2 sat- 94%  Informed pt of the plan for labs and CT abd/pelvis. Pt understands and agrees with the plan, all questions answered.    05;12  Toradol and Zofran ordered to treat pain. CT abd/pelvis, CMP, CBC, and UA ordered.     05:57  BP- (!) 168/110 HR- 76 Temp- 96.7 °F (35.9 °C) (Tympanic) O2 sat- 94%  Rechecked the patient who is in NAD and is resting comfortably. Informed pt that the CT shows a subcapsular hematoma. Informed him of the plan for admission for observation. Pt understands and agrees with the plan,  all questions answered.    06:12  Call placed to urology for admission.    06;15  Dilaudid ordered to treat pain.     06;23  Discussed pt's case with Dr Nadeem Younger (urology), who agrees to consult. States that the pt needs a minimum of 24 hours of observation. Defers admission to A, if they are uncomfortable with admitting pt, pt will need to be transferred to [facility]    06:25  Call placed to A for admission. Type and screen ordered     06:34  Discussed pt's case with Dr Berger ([facility]), who agrees to admit the pt     MEDICAL DECISION MAKING  Results were reviewed/discussed with the patient and they were also made aware of online access. Pt also made aware that some labs, such as cultures, will not be resulted during ER visit and follow up with PMD is necessary.     MDM  Number of Diagnoses or Management Options     Amount and/or Complexity of Data Reviewed  Clinical lab tests: ordered and reviewed (BUN=37, creatinine=1.42)  Tests in the radiology section of CPT®: ordered and reviewed (CT abd/pelvis shows 2.5 cm thickness left subcapsular hematoma with mild hemorrhage and inflammation in the adjacent pararenal fat.)  Decide to obtain previous medical records or to obtain history from someone other than the patient: yes  Discuss the patient with other providers: yes (Dr Younger, urology  Dr Berger, [facility])    Patient Progress  Patient progress: stable         DIAGNOSIS  Final diagnoses:   None       DISPOSITION  ADMISSION    Discussed treatment plan and reason for admission with pt/family and admitting physician.  Pt/family voiced understanding of the plan for admission for further testing/treatment as needed.     Latest Documented Vital Signs:  As of 6:37 AM  BP- 140/86 HR- 76 Temp- 96.7 °F (35.9 °C) (Tympanic) O2 sat- 96%    --  Documentation assistance provided by carlos Jack for Dr Barry.  Information recorded by the carlos was done at my direction and has been verified and validated by me.         Andria Jack  03/14/19 0637       Kareem Barry MD  03/14/19 0638

## 2019-03-14 NOTE — ED NOTES
"Pt to ED via PV. Pt states \"I think I have a kidney stone\". Pt c/o LLQ abdominal pain that started at 0100. Pt denies n/v/d or urinary symptoms.     Pt hx kidney stones.     Becki Conway, RN  03/14/19 0940    "

## 2019-03-15 LAB
ALBUMIN SERPL-MCNC: 3.9 G/DL (ref 3.5–5.2)
ALBUMIN/GLOB SERPL: 1.6 G/DL
ALP SERPL-CCNC: 63 U/L (ref 39–117)
ALT SERPL W P-5'-P-CCNC: 23 U/L (ref 1–41)
ANION GAP SERPL CALCULATED.3IONS-SCNC: 10.1 MMOL/L
AST SERPL-CCNC: 15 U/L (ref 1–40)
BILIRUB SERPL-MCNC: 0.6 MG/DL (ref 0.1–1.2)
BUN BLD-MCNC: 23 MG/DL (ref 6–20)
BUN/CREAT SERPL: 16 (ref 7–25)
CALCIUM SPEC-SCNC: 8.3 MG/DL (ref 8.6–10.5)
CHLORIDE SERPL-SCNC: 103 MMOL/L (ref 98–107)
CO2 SERPL-SCNC: 23.9 MMOL/L (ref 22–29)
CREAT BLD-MCNC: 1.44 MG/DL (ref 0.76–1.27)
DEPRECATED RDW RBC AUTO: 38.2 FL (ref 37–54)
ERYTHROCYTE [DISTWIDTH] IN BLOOD BY AUTOMATED COUNT: 12.4 % (ref 12.3–15.4)
GFR SERPL CREATININE-BSD FRML MDRD: 53 ML/MIN/1.73
GLOBULIN UR ELPH-MCNC: 2.4 GM/DL
GLUCOSE BLD-MCNC: 102 MG/DL (ref 65–99)
HCT VFR BLD AUTO: 38.2 % (ref 37.5–51)
HCT VFR BLD AUTO: 40.3 % (ref 37.5–51)
HGB BLD-MCNC: 12.8 G/DL (ref 13–17.7)
HGB BLD-MCNC: 13.4 G/DL (ref 13–17.7)
MCH RBC QN AUTO: 28.4 PG (ref 26.6–33)
MCHC RBC AUTO-ENTMCNC: 33.5 G/DL (ref 31.5–35.7)
MCV RBC AUTO: 84.9 FL (ref 79–97)
PLATELET # BLD AUTO: 170 10*3/MM3 (ref 140–450)
PMV BLD AUTO: 8.8 FL (ref 6–12)
POTASSIUM BLD-SCNC: 4 MMOL/L (ref 3.5–5.2)
PROT SERPL-MCNC: 6.3 G/DL (ref 6–8.5)
RBC # BLD AUTO: 4.5 10*6/MM3 (ref 4.14–5.8)
SODIUM BLD-SCNC: 137 MMOL/L (ref 136–145)
WBC NRBC COR # BLD: 6.88 10*3/MM3 (ref 3.4–10.8)

## 2019-03-15 PROCEDURE — 80053 COMPREHEN METABOLIC PANEL: CPT | Performed by: NURSE PRACTITIONER

## 2019-03-15 PROCEDURE — 96361 HYDRATE IV INFUSION ADD-ON: CPT

## 2019-03-15 PROCEDURE — 25010000002 HYDROMORPHONE PER 4 MG: Performed by: NURSE PRACTITIONER

## 2019-03-15 PROCEDURE — 85018 HEMOGLOBIN: CPT | Performed by: INTERNAL MEDICINE

## 2019-03-15 PROCEDURE — 85027 COMPLETE CBC AUTOMATED: CPT | Performed by: NURSE PRACTITIONER

## 2019-03-15 PROCEDURE — 96376 TX/PRO/DX INJ SAME DRUG ADON: CPT

## 2019-03-15 PROCEDURE — G0378 HOSPITAL OBSERVATION PER HR: HCPCS

## 2019-03-15 PROCEDURE — 25010000002 ONDANSETRON PER 1 MG: Performed by: NURSE PRACTITIONER

## 2019-03-15 PROCEDURE — 85014 HEMATOCRIT: CPT | Performed by: INTERNAL MEDICINE

## 2019-03-15 RX ORDER — SODIUM CHLORIDE 9 MG/ML
75 INJECTION, SOLUTION INTRAVENOUS CONTINUOUS
Status: DISCONTINUED | OUTPATIENT
Start: 2019-03-15 | End: 2019-03-16 | Stop reason: HOSPADM

## 2019-03-15 RX ORDER — HYDROCODONE BITARTRATE AND ACETAMINOPHEN 10; 325 MG/1; MG/1
1 TABLET ORAL EVERY 4 HOURS PRN
Status: DISCONTINUED | OUTPATIENT
Start: 2019-03-15 | End: 2019-03-16 | Stop reason: HOSPADM

## 2019-03-15 RX ADMIN — HYDROMORPHONE HYDROCHLORIDE 0.5 MG: 1 INJECTION, SOLUTION INTRAMUSCULAR; INTRAVENOUS; SUBCUTANEOUS at 12:33

## 2019-03-15 RX ADMIN — HYDROCODONE BITARTRATE AND ACETAMINOPHEN 1 TABLET: 10; 325 TABLET ORAL at 16:59

## 2019-03-15 RX ADMIN — BUPROPION HYDROCHLORIDE 150 MG: 75 TABLET, FILM COATED ORAL at 08:34

## 2019-03-15 RX ADMIN — HYDROMORPHONE HYDROCHLORIDE 0.5 MG: 1 INJECTION, SOLUTION INTRAMUSCULAR; INTRAVENOUS; SUBCUTANEOUS at 08:36

## 2019-03-15 RX ADMIN — SODIUM CHLORIDE 100 ML/HR: 9 INJECTION, SOLUTION INTRAVENOUS at 06:30

## 2019-03-15 RX ADMIN — FLUOXETINE HYDROCHLORIDE 40 MG: 20 CAPSULE ORAL at 08:34

## 2019-03-15 RX ADMIN — HYDROMORPHONE HYDROCHLORIDE 0.5 MG: 1 INJECTION, SOLUTION INTRAMUSCULAR; INTRAVENOUS; SUBCUTANEOUS at 14:35

## 2019-03-15 RX ADMIN — SODIUM CHLORIDE 75 ML/HR: 9 INJECTION, SOLUTION INTRAVENOUS at 19:41

## 2019-03-15 RX ADMIN — SODIUM CHLORIDE, PRESERVATIVE FREE 3 ML: 5 INJECTION INTRAVENOUS at 22:07

## 2019-03-15 RX ADMIN — BUSPIRONE HYDROCHLORIDE 10 MG: 10 TABLET ORAL at 22:06

## 2019-03-15 RX ADMIN — ACETAMINOPHEN 650 MG: 325 TABLET ORAL at 17:50

## 2019-03-15 RX ADMIN — ONDANSETRON 4 MG: 2 INJECTION INTRAMUSCULAR; INTRAVENOUS at 16:59

## 2019-03-15 RX ADMIN — SODIUM CHLORIDE, PRESERVATIVE FREE 10 ML: 5 INJECTION INTRAVENOUS at 10:47

## 2019-03-15 RX ADMIN — ACETAMINOPHEN 650 MG: 325 TABLET ORAL at 08:34

## 2019-03-15 RX ADMIN — HYDROMORPHONE HYDROCHLORIDE 0.5 MG: 1 INJECTION, SOLUTION INTRAMUSCULAR; INTRAVENOUS; SUBCUTANEOUS at 04:20

## 2019-03-15 RX ADMIN — HYDROCODONE BITARTRATE AND ACETAMINOPHEN 1 TABLET: 10; 325 TABLET ORAL at 22:10

## 2019-03-15 NOTE — PROGRESS NOTES
Continued Stay Note  [facility]     Patient Name: Navin Hare  MRN: [id number]  Today's Date: 3/15/2019    Admit Date: 3/14/2019    Discharge Plan     Row Name 03/15/19 0958       Plan    Plan  Plan home.   MATTHEW Edmonds RN    Plan Comments  FACE SHEET VERIFIED.  Spoke to pt at bedside.  PT's PCP is ROSANNE Gonzales.  Pt lives alone in an apartment.  Pt is independent with ADL's.  Pt gets prescriptions at [facility]  ( [facility]).  Pt denies any issues affording medications.  Pt is not current with HH.  Pt has not been in SNF.  Pt denies any needs.  Plan home.  MATTHEW Edmonds RN        Discharge Codes    No documentation.             Mikaela Edmonds, RN

## 2019-03-15 NOTE — PLAN OF CARE
Problem: Patient Care Overview  Goal: Plan of Care Review  Outcome: Ongoing (interventions implemented as appropriate)   03/15/19 0438   Coping/Psychosocial   Plan of Care Reviewed With patient   Plan of Care Review   Progress no change   OTHER   Outcome Summary PT SLEPT SOME AND AWAKE AT THIS MOMENT, C/O PAIN TO LLQ ,PAIN SCALE OF 5-7, RECEIVING PRN DILAUDID INJ AND IS EFFECTIVE PER PT. TAKING CLEAR LIQUIDS WELL WITH NO C/O NAUSEA . UP TO BR INDEPENDENTLY, VOIDING ADEQUATELY WITHOUT PROBLEMS, URINE, LIGHT. CLEAR, YELLOW. NAD, VSS, B/P SL ELEVATED.      Goal: Individualization and Mutuality  Outcome: Ongoing (interventions implemented as appropriate)    Goal: Discharge Needs Assessment  Outcome: Ongoing (interventions implemented as appropriate)    Goal: Interprofessional Rounds/Family Conf  Outcome: Ongoing (interventions implemented as appropriate)      Problem: Pain, Acute (Adult)  Goal: Identify Related Risk Factors and Signs and Symptoms  Outcome: Ongoing (interventions implemented as appropriate)    Goal: Acceptable Pain Control/Comfort Level  Outcome: Ongoing (interventions implemented as appropriate)      Problem: Renal Failure/Kidney Injury, Acute (Adult)  Goal: Signs and Symptoms of Listed Potential Problems Will be Absent, Minimized or Managed (Renal Failure/Kidney Injury, Acute)  Outcome: Ongoing (interventions implemented as appropriate)

## 2019-03-15 NOTE — PROGRESS NOTES
Discharge Planning Assessment  [facility]     Patient Name: Navin Hare  MRN: [id number]  Today's Date: 3/15/2019    Admit Date: 3/14/2019    Discharge Needs Assessment     Row Name 03/15/19 0957       Living Environment    Lives With  alone    Current Living Arrangements  home/apartment/condo    Primary Care Provided by  self    Provides Primary Care For  no one    Able to Return to Prior Arrangements  yes    Living Arrangement Comments  Pt lives alone in an apartment.          Resource/Environmental Concerns    Resource/Environmental Concerns  none    Transportation Concerns  car, none       Transition Planning    Patient/Family Anticipates Transition to  home    Patient/Family Anticipated Services at Transition  none    Transportation Anticipated  car, drives self;family or friend will provide       Discharge Needs Assessment    Readmission Within the Last 30 Days  no previous admission in last 30 days    Concerns to be Addressed  denies needs/concerns at this time    Equipment Currently Used at Home  none    Anticipated Changes Related to Illness  none    Equipment Needed After Discharge  none    Offered/Gave Vendor List  no        Discharge Plan     Row Name 03/15/19 0958       Plan    Plan  Plan home.   MATTHEW Edmonds RN    Plan Comments  FACE SHEET VERIFIED.  Spoke to pt at bedside.  PT's PCP is ROSANNE Gonzales.  Pt lives alone in an apartment.  Pt is independent with ADL's.  Pt gets prescriptions at [facility]  ( [facility]  Pt denies any issues affording medications.  Pt is not current with .  Pt has not been in SNF.  Pt denies any needs.  Plan home.  MATTHEW Edmonds RN        Destination      No service coordination in this encounter.      Durable Medical Equipment      No service coordination in this encounter.      Dialysis/Infusion      No service coordination in this encounter.      Home Medical Care      No service coordination in this encounter.      Community Resources      No service  coordination in this encounter.          Demographic Summary     Row Name 03/15/19 0956       General Information    Admission Type  observation    Arrived From  emergency department    Referral Source  admission list    Reason for Consult  discharge planning    Preferred Language  English     Used During This Interaction  no        Functional Status     Row Name 03/15/19 0956       Functional Status    Usual Activity Tolerance  good    Current Activity Tolerance  good       Functional Status, IADL    Medications  independent    Meal Preparation  independent    Housekeeping  independent    Laundry  independent    Shopping  independent       Mental Status    General Appearance WDL  WDL       Mental Status Summary    Recent Changes in Mental Status/Cognitive Functioning  no changes        Psychosocial    No documentation.       Abuse/Neglect    No documentation.       Legal    No documentation.       Substance Abuse    No documentation.       Patient Forms    No documentation.           Mikaela Edmonds, ESA

## 2019-03-15 NOTE — PROGRESS NOTES
Name: Navin Hare ADMIT: 3/14/2019   : 1975  PCP: Sangeeta Nation APRN    MRN: [id number] LOS: 1 days   AGE/SEX: 43 y.o. male  ROOM: [facility]/     Subjective   Subjective     Pain is improved with pain medicine.  Denies any hematuria, dysuria.  No previous history of renal disease according to the patient.  Creatinine is stable at 1.4 despite IVF.  Hemoglobin dropped slightly.     Objective   Objective   Vital Signs  Temp:  [97.3 °F (36.3 °C)-98.4 °F (36.9 °C)] 98 °F (36.7 °C)  Heart Rate:  [73-95] 75  Resp:  [16-17] 17  BP: (147-155)/(88-99) 153/99  SpO2:  [98 %-99 %] 99 %  on   ;   Device (Oxygen Therapy): room air  Body mass index is 29.82 kg/m².  Physical Exam   Constitutional: He is oriented to person, place, and time. He appears well-developed and well-nourished. No distress.   HENT:   Head: Normocephalic and atraumatic.   Mouth/Throat: No oropharyngeal exudate.   Pulmonary/Chest: Effort normal and breath sounds normal.   Abdominal: Soft. Bowel sounds are normal. He exhibits no distension. There is no tenderness. There is no guarding.   No flank or CVA tenderness   Musculoskeletal: He exhibits no edema or tenderness.   Neurological: He is alert and oriented to person, place, and time.   Skin: Skin is warm and dry. No pallor.   Psychiatric: He has a normal mood and affect. His behavior is normal.   Vitals reviewed.      Results Review:       I reviewed the patient's new clinical results.  Results from last 7 days   Lab Units 03/15/19  0632 19  1201 19  0515   WBC 10*3/mm3 6.88 7.71 9.94   HEMOGLOBIN g/dL 12.8* 14.5 15.4   PLATELETS 10*3/mm3 170 185 224     Results from last 7 days   Lab Units 03/15/19  0632 19  1201 19  0515   SODIUM mmol/L 137 136 138   POTASSIUM mmol/L 4.0 4.3 4.0   CHLORIDE mmol/L 103 100 99   CO2 mmol/L 23.9 22.4 23.8   BUN mg/dL 23* 34* 37*   CREATININE mg/dL 1.44* 1.41* 1.42*   GLUCOSE mg/dL 102* 112* 109*   Estimated Creatinine Clearance: 79.2  mL/min (A) (by C-G formula based on SCr of 1.44 mg/dL (H)).  Results from last 7 days   Lab Units 03/15/19  0632 03/14/19  0515   ALBUMIN g/dL 3.90 4.70   BILIRUBIN mg/dL 0.6 0.4   ALK PHOS U/L 63 79   AST (SGOT) U/L 15 20   ALT (SGPT) U/L 23 28     Results from last 7 days   Lab Units 03/15/19  0632 03/14/19  1201 03/14/19  0515   CALCIUM mg/dL 8.3* 9.1 9.6   ALBUMIN g/dL 3.90  --  4.70       No results found for: HGBA1C, POCGLU      buPROPion 150 mg Oral Daily   busPIRone 10 mg Oral Q12H   FLUoxetine 40 mg Oral Daily   sodium chloride 3 mL Intravenous Q12H       sodium chloride 75 mL/hr   Diet Clear Liquid       Assessment/Plan     Active Hospital Problems    Diagnosis  POA   • Renal hematoma, left [S37.012A]  Yes   • NAIF (acute kidney injury) (CMS/HCC) [N17.9]  Yes   • Elevated BP without diagnosis of hypertension [R03.0]  Yes      Resolved Hospital Problems   No resolved problems to display.     · Left renal hematoma secondary to hockey injury.  Continue trending hemoglobin.  Appreciate urology evaluation.  Needs follow-up CT scan in 2 weeks.  Cannot have any contact of the for 1 month and needs bedrest for 24 hours according to urology.  · Acute kidney injury: Versus chronic kidney disease.  I suspect related to his kidney hematoma.  Continue IV fluids and recheck in the morning.  · Elevated blood pressure without diagnosis of hypertension: Continue watching but if his blood pressure increases we will need to place him on medications, I would choose Norvasc to start off with given renal injury.  · SCDs for DVT prophylaxis  · Advance diet as tolerated  · Disposition likely home tomorrow    Bertin Arvizu MD  [facility]  03/15/19  12:22 PM

## 2019-03-16 VITALS
OXYGEN SATURATION: 95 % | HEART RATE: 70 BPM | DIASTOLIC BLOOD PRESSURE: 81 MMHG | WEIGHT: 220.6 LBS | SYSTOLIC BLOOD PRESSURE: 140 MMHG | RESPIRATION RATE: 18 BRPM | HEIGHT: 72 IN | BODY MASS INDEX: 29.88 KG/M2 | TEMPERATURE: 99.9 F

## 2019-03-16 LAB
ANION GAP SERPL CALCULATED.3IONS-SCNC: 9.6 MMOL/L
BUN BLD-MCNC: 16 MG/DL (ref 6–20)
BUN/CREAT SERPL: 12 (ref 7–25)
CALCIUM SPEC-SCNC: 7.9 MG/DL (ref 8.6–10.5)
CHLORIDE SERPL-SCNC: 105 MMOL/L (ref 98–107)
CO2 SERPL-SCNC: 24.4 MMOL/L (ref 22–29)
CREAT BLD-MCNC: 1.33 MG/DL (ref 0.76–1.27)
GFR SERPL CREATININE-BSD FRML MDRD: 58 ML/MIN/1.73
GLUCOSE BLD-MCNC: 129 MG/DL (ref 65–99)
POTASSIUM BLD-SCNC: 3.2 MMOL/L (ref 3.5–5.2)
SODIUM BLD-SCNC: 139 MMOL/L (ref 136–145)

## 2019-03-16 PROCEDURE — 96361 HYDRATE IV INFUSION ADD-ON: CPT

## 2019-03-16 PROCEDURE — 80048 BASIC METABOLIC PNL TOTAL CA: CPT | Performed by: INTERNAL MEDICINE

## 2019-03-16 PROCEDURE — G0378 HOSPITAL OBSERVATION PER HR: HCPCS

## 2019-03-16 RX ORDER — POTASSIUM CHLORIDE 750 MG/1
40 CAPSULE, EXTENDED RELEASE ORAL ONCE
Status: COMPLETED | OUTPATIENT
Start: 2019-03-16 | End: 2019-03-16

## 2019-03-16 RX ADMIN — SODIUM CHLORIDE, PRESERVATIVE FREE 3 ML: 5 INJECTION INTRAVENOUS at 08:29

## 2019-03-16 RX ADMIN — POTASSIUM CHLORIDE 40 MEQ: 750 CAPSULE, EXTENDED RELEASE ORAL at 12:00

## 2019-03-16 RX ADMIN — BUPROPION HYDROCHLORIDE 150 MG: 75 TABLET, FILM COATED ORAL at 08:28

## 2019-03-16 RX ADMIN — BUSPIRONE HYDROCHLORIDE 10 MG: 10 TABLET ORAL at 10:30

## 2019-03-16 RX ADMIN — FLUOXETINE HYDROCHLORIDE 40 MG: 20 CAPSULE ORAL at 08:28

## 2019-03-16 NOTE — PLAN OF CARE
Problem: Patient Care Overview  Goal: Plan of Care Review  Outcome: Ongoing (interventions implemented as appropriate)   03/16/19 0338   Coping/Psychosocial   Plan of Care Reviewed With patient   Plan of Care Review   Progress improving   OTHER   Outcome Summary Pt A&O. C/o pain x1, norco given x1. No other distress noted. VSS. Pt resting in bed. Given nightly meds. NS going at 75mL/hr. Will continue to monitor pt.     Goal: Individualization and Mutuality  Outcome: Ongoing (interventions implemented as appropriate)    Goal: Discharge Needs Assessment  Outcome: Ongoing (interventions implemented as appropriate)    Goal: Interprofessional Rounds/Family Conf  Outcome: Ongoing (interventions implemented as appropriate)      Problem: Pain, Acute (Adult)  Goal: Identify Related Risk Factors and Signs and Symptoms  Outcome: Ongoing (interventions implemented as appropriate)    Goal: Acceptable Pain Control/Comfort Level  Outcome: Ongoing (interventions implemented as appropriate)      Problem: Renal Failure/Kidney Injury, Acute (Adult)  Goal: Signs and Symptoms of Listed Potential Problems Will be Absent, Minimized or Managed (Renal Failure/Kidney Injury, Acute)  Outcome: Ongoing (interventions implemented as appropriate)

## 2019-03-16 NOTE — DISCHARGE SUMMARY
Patient Name: Navin Hare  : 1975  MRN: [id number]    Date of Admission: 3/14/2019  Date of Discharge:  3/16/2019  Primary Care Physician: Sangeeta Nation APRN      Hospital Course     Chief Complaint:   Abdominal Pain      Active Hospital Problems    Diagnosis  POA   • **Renal hematoma, left [S37.012A]  Yes   • NAIF (acute kidney injury) (CMS/HCC) [N17.9]  Yes   • Elevated BP without diagnosis of hypertension [R03.0]  Yes      Resolved Hospital Problems   No resolved problems to display.        Hospital Course:    The patient is a very pleasant male who came to the hospital after having a hockey injury.  He was diagnosed with left kidney hematoma and was admitted to our service.  He also had acute kidney injury and elevated blood pressure without diagnosis of hypertension.  CT scan confirmed the hematoma and urology was consulted.  His hemoglobin remained stable and his pain is now completely gone.  He was given IV fluids for hydration for the kidney injury.  His creatinine today is 1.33 and I suspect will continue to improve over time.  He needs to follow-up with urology in 2 weeks where they will repeat a CT scan.  He has been told not to undergo any contact activity for 1 month and he stated understanding.  He is medically stable for discharge home today.    Day of Discharge       Physical Exam:  Temp:  [97.8 °F (36.6 °C)-99.9 °F (37.7 °C)] 99.9 °F (37.7 °C)  Heart Rate:  [62-77] 70  Resp:  [16-18] 18  BP: (123-160)/(74-89) 140/81  Body mass index is 29.92 kg/m².  Physical Exam  Constitutional: He is oriented to person, place, and time. He appears well-developed and well-nourished. No distress.   HENT:   Head: Normocephalic and atraumatic.   Mouth/Throat: No oropharyngeal exudate.   Pulmonary/Chest: Effort normal and breath sounds normal.   Abdominal: Soft. Bowel sounds are normal. He exhibits no distension. There is no tenderness. There is no guarding.   No flank or CVA tenderness    Musculoskeletal: He exhibits no edema or tenderness.   Neurological: He is alert and oriented to person, place, and time.   Skin: Skin is warm and dry. No pallor.   Psychiatric: He has a normal mood and affect. His behavior is normal.   Vitals reviewed.        Consultants     Consult Orders (all) (From admission, onward)    Start     Ordered    03/14/19 0814  Inpatient Urology Consult  Once     Specialty:  Urology  Provider:  (Not yet assigned)    03/14/19 0814          Pertinent Labs and Procedures     Results from last 7 days   Lab Units 03/15/19  1141 03/15/19  0632 03/14/19  1201 03/14/19  0515   WBC 10*3/mm3  --  6.88 7.71 9.94   HEMOGLOBIN g/dL 13.4 12.8* 14.5 15.4   PLATELETS 10*3/mm3  --  170 185 224     Results from last 7 days   Lab Units 03/16/19  0843 03/15/19  0632 03/14/19  1201 03/14/19  0515   SODIUM mmol/L 139 137 136 138   POTASSIUM mmol/L 3.2* 4.0 4.3 4.0   CHLORIDE mmol/L 105 103 100 99   CO2 mmol/L 24.4 23.9 22.4 23.8   BUN mg/dL 16 23* 34* 37*   CREATININE mg/dL 1.33* 1.44* 1.41* 1.42*   GLUCOSE mg/dL 129* 102* 112* 109*   Estimated Creatinine Clearance: 85.9 mL/min (A) (by C-G formula based on SCr of 1.33 mg/dL (H)).  Results from last 7 days   Lab Units 03/15/19  0632 03/14/19  0515   ALBUMIN g/dL 3.90 4.70   BILIRUBIN mg/dL 0.6 0.4   ALK PHOS U/L 63 79   AST (SGOT) U/L 15 20   ALT (SGPT) U/L 23 28     Results from last 7 days   Lab Units 03/16/19  0843 03/15/19  0632 03/14/19  1201 03/14/19  0515   CALCIUM mg/dL 7.9* 8.3* 9.1 9.6   ALBUMIN g/dL  --  3.90  --  4.70               Invalid input(s): LDLCALC        * Surgery not found *    Ct Abdomen Pelvis Without Contrast    Result Date: 3/14/2019  1.  2.5 cm thickness left subcapsular hematoma with mild hemorrhage and inflammation in the adjacent pararenal fat. Follow-up recommended to ensure resolution. 2. Stable 4 mm right lower lobe nodule most consistent with benign granuloma     This report was finalized on 3/14/2019 5:50 AM by Donald  DAVID Dinh                  Test Results Pending at Discharge   None    Discharge Details        Discharge Medications      Continue These Medications      Instructions Start Date   BUPROPION HCL PO   150 mg, Oral, Daily      BUSPIRONE HCL PO   10 mg, Oral, Every 12 Hours      FLUoxetine 40 MG capsule  Commonly known as:  PROzac   40 mg, Oral, Daily             No Known Allergies      Discharge Disposition:  Home or Self Care    Discharge Diet:  Diet Order   Procedures   • Diet Regular       Discharge Activity:   Activity Instructions     Discharge Activity Restrictions      No contact activity for one month          CODE STATUS:    Code Status and Medical Interventions:   Ordered at: 03/14/19 0915     Level Of Support Discussed With:    Patient     Code Status:    CPR     Medical Interventions (Level of Support Prior to Arrest):    Full       No future appointments.  Follow-up Information     Sangeeta Nation APRN Follow up in 1 week(s).    Specialty:  Family Medicine  Why:  with labs (CBC and BMP)  Contact information:  [address] [place] [address] [id number] [phone]             Richmond Younger MD Follow up in 2 week(s).    Specialty:  Urology  Why:  hospital follow up and repeat CT scan  Contact information:  [address] [place], [place] [id number] [phone]                       Time Spent on Discharge:  Greater than 30 minutes      Electronically signed by Bertin Arvizu MD, 3/16/2019, 1:18 PM

## 2019-03-18 NOTE — PROGRESS NOTES
Case Management Discharge Note    Final Note: Pt discharged home on 3/16.   MATTHEW Edmonds RN    Destination      No service has been selected for the patient.      Durable Medical Equipment      No service has been selected for the patient.      Dialysis/Infusion      No service has been selected for the patient.      Home Medical Care      No service has been selected for the patient.      Community Resources      No service has been selected for the patient.        Transportation Services  Other: Other(Private Auto)    Final Discharge Disposition Code: 01 - home or self-care

## 2021-04-02 ENCOUNTER — BULK ORDERING (OUTPATIENT)
Dept: CASE MANAGEMENT | Facility: OTHER | Age: 46
End: 2021-04-02

## 2021-04-02 DIAGNOSIS — Z23 IMMUNIZATION DUE: ICD-10-CM

## 2021-09-09 ENCOUNTER — HOSPITAL ENCOUNTER (INPATIENT)
Facility: HOSPITAL | Age: 46
LOS: 1 days | Discharge: HOME | End: 2021-09-10
Attending: EMERGENCY MEDICINE

## 2021-09-09 ENCOUNTER — APPOINTMENT (OUTPATIENT)
Dept: GENERAL RADIOLOGY | Facility: HOSPITAL | Age: 46
End: 2021-09-09

## 2021-09-09 DIAGNOSIS — Z95.5 S/P DRUG ELUTING CORONARY STENT PLACEMENT: ICD-10-CM

## 2021-09-09 DIAGNOSIS — I21.4 NSTEMI (NON-ST ELEVATED MYOCARDIAL INFARCTION) (HCC): Primary | ICD-10-CM

## 2021-09-09 LAB
ALBUMIN SERPL-MCNC: 4.2 G/DL (ref 3.5–5.2)
ALBUMIN/GLOB SERPL: 1.8 G/DL
ALP SERPL-CCNC: 78 U/L (ref 39–117)
ALT SERPL W P-5'-P-CCNC: 49 U/L (ref 1–41)
ANION GAP SERPL CALCULATED.3IONS-SCNC: 13.5 MMOL/L (ref 5–15)
AST SERPL-CCNC: 25 U/L (ref 1–40)
BASOPHILS # BLD AUTO: 0.06 10*3/MM3 (ref 0–0.2)
BASOPHILS NFR BLD AUTO: 1 % (ref 0–1.5)
BILIRUB SERPL-MCNC: 0.5 MG/DL (ref 0–1.2)
BUN SERPL-MCNC: 22 MG/DL (ref 6–20)
BUN/CREAT SERPL: 23.9 (ref 7–25)
CALCIUM SPEC-SCNC: 8.6 MG/DL (ref 8.6–10.5)
CHLORIDE SERPL-SCNC: 102 MMOL/L (ref 98–107)
CHOLEST SERPL-MCNC: 185 MG/DL (ref 0–200)
CO2 SERPL-SCNC: 22.5 MMOL/L (ref 22–29)
CREAT SERPL-MCNC: 0.92 MG/DL (ref 0.76–1.27)
D DIMER PPP FEU-MCNC: <0.27 MCGFEU/ML (ref 0–0.49)
DEPRECATED RDW RBC AUTO: 43.2 FL (ref 37–54)
EOSINOPHIL # BLD AUTO: 0.19 10*3/MM3 (ref 0–0.4)
EOSINOPHIL NFR BLD AUTO: 3.1 % (ref 0.3–6.2)
ERYTHROCYTE [DISTWIDTH] IN BLOOD BY AUTOMATED COUNT: 13.8 % (ref 12.3–15.4)
GFR SERPL CREATININE-BSD FRML MDRD: 88 ML/MIN/1.73
GLOBULIN UR ELPH-MCNC: 2.4 GM/DL
GLUCOSE SERPL-MCNC: 143 MG/DL (ref 65–99)
HCT VFR BLD AUTO: 48.9 % (ref 37.5–51)
HDLC SERPL-MCNC: 41 MG/DL (ref 40–60)
HGB BLD-MCNC: 15.9 G/DL (ref 13–17.7)
HOLD SPECIMEN: NORMAL
HOLD SPECIMEN: NORMAL
IMM GRANULOCYTES # BLD AUTO: 0.07 10*3/MM3 (ref 0–0.05)
IMM GRANULOCYTES NFR BLD AUTO: 1.1 % (ref 0–0.5)
LDLC SERPL CALC-MCNC: 119 MG/DL (ref 0–100)
LDLC/HDLC SERPL: 2.84 {RATIO}
LYMPHOCYTES # BLD AUTO: 1.25 10*3/MM3 (ref 0.7–3.1)
LYMPHOCYTES NFR BLD AUTO: 20.1 % (ref 19.6–45.3)
MCH RBC QN AUTO: 27.9 PG (ref 26.6–33)
MCHC RBC AUTO-ENTMCNC: 32.5 G/DL (ref 31.5–35.7)
MCV RBC AUTO: 85.9 FL (ref 79–97)
MONOCYTES # BLD AUTO: 0.38 10*3/MM3 (ref 0.1–0.9)
MONOCYTES NFR BLD AUTO: 6.1 % (ref 5–12)
NEUTROPHILS NFR BLD AUTO: 4.27 10*3/MM3 (ref 1.7–7)
NEUTROPHILS NFR BLD AUTO: 68.6 % (ref 42.7–76)
NRBC BLD AUTO-RTO: 0 /100 WBC (ref 0–0.2)
PLATELET # BLD AUTO: 206 10*3/MM3 (ref 140–450)
PMV BLD AUTO: 8.7 FL (ref 6–12)
POTASSIUM SERPL-SCNC: 3.6 MMOL/L (ref 3.5–5.2)
PROT SERPL-MCNC: 6.6 G/DL (ref 6–8.5)
QT INTERVAL: 382 MS
QT INTERVAL: 458 MS
RBC # BLD AUTO: 5.69 10*6/MM3 (ref 4.14–5.8)
SARS-COV-2 RNA RESP QL NAA+PROBE: NOT DETECTED
SODIUM SERPL-SCNC: 138 MMOL/L (ref 136–145)
TRIGL SERPL-MCNC: 137 MG/DL (ref 0–150)
TROPONIN T SERPL-MCNC: 0.06 NG/ML (ref 0–0.03)
TROPONIN T SERPL-MCNC: 0.44 NG/ML (ref 0–0.03)
TROPONIN T SERPL-MCNC: <0.01 NG/ML (ref 0–0.03)
VLDLC SERPL-MCNC: 25 MG/DL (ref 5–40)
WBC # BLD AUTO: 6.22 10*3/MM3 (ref 3.4–10.8)
WHOLE BLOOD HOLD SPECIMEN: NORMAL
WHOLE BLOOD HOLD SPECIMEN: NORMAL

## 2021-09-09 PROCEDURE — 93005 ELECTROCARDIOGRAM TRACING: CPT | Performed by: INTERNAL MEDICINE

## 2021-09-09 PROCEDURE — 0 IOPAMIDOL PER 1 ML: Performed by: INTERNAL MEDICINE

## 2021-09-09 PROCEDURE — C1769 GUIDE WIRE: HCPCS | Performed by: INTERNAL MEDICINE

## 2021-09-09 PROCEDURE — 80053 COMPREHEN METABOLIC PANEL: CPT

## 2021-09-09 PROCEDURE — 99285 EMERGENCY DEPT VISIT HI MDM: CPT

## 2021-09-09 PROCEDURE — C1757 CATH, THROMBECTOMY/EMBOLECT: HCPCS | Performed by: INTERNAL MEDICINE

## 2021-09-09 PROCEDURE — 93458 L HRT ARTERY/VENTRICLE ANGIO: CPT | Performed by: INTERNAL MEDICINE

## 2021-09-09 PROCEDURE — B2111ZZ FLUOROSCOPY OF MULTIPLE CORONARY ARTERIES USING LOW OSMOLAR CONTRAST: ICD-10-PCS | Performed by: INTERNAL MEDICINE

## 2021-09-09 PROCEDURE — 25010000002 EPTIFIBATIDE 20 MG/10ML SOLUTION: Performed by: INTERNAL MEDICINE

## 2021-09-09 PROCEDURE — C1725 CATH, TRANSLUMIN NON-LASER: HCPCS | Performed by: INTERNAL MEDICINE

## 2021-09-09 PROCEDURE — 4A023N7 MEASUREMENT OF CARDIAC SAMPLING AND PRESSURE, LEFT HEART, PERCUTANEOUS APPROACH: ICD-10-PCS | Performed by: INTERNAL MEDICINE

## 2021-09-09 PROCEDURE — 99153 MOD SED SAME PHYS/QHP EA: CPT | Performed by: INTERNAL MEDICINE

## 2021-09-09 PROCEDURE — 71046 X-RAY EXAM CHEST 2 VIEWS: CPT

## 2021-09-09 PROCEDURE — C1874 STENT, COATED/COV W/DEL SYS: HCPCS | Performed by: INTERNAL MEDICINE

## 2021-09-09 PROCEDURE — 25010000002 MORPHINE PER 10 MG: Performed by: INTERNAL MEDICINE

## 2021-09-09 PROCEDURE — 3E033PZ INTRODUCTION OF PLATELET INHIBITOR INTO PERIPHERAL VEIN, PERCUTANEOUS APPROACH: ICD-10-PCS | Performed by: INTERNAL MEDICINE

## 2021-09-09 PROCEDURE — U0003 INFECTIOUS AGENT DETECTION BY NUCLEIC ACID (DNA OR RNA); SEVERE ACUTE RESPIRATORY SYNDROME CORONAVIRUS 2 (SARS-COV-2) (CORONAVIRUS DISEASE [COVID-19]), AMPLIFIED PROBE TECHNIQUE, MAKING USE OF HIGH THROUGHPUT TECHNOLOGIES AS DESCRIBED BY CMS-2020-01-R: HCPCS | Performed by: EMERGENCY MEDICINE

## 2021-09-09 PROCEDURE — 92941 PRQ TRLML REVSC TOT OCCL AMI: CPT | Performed by: INTERNAL MEDICINE

## 2021-09-09 PROCEDURE — C1887 CATHETER, GUIDING: HCPCS | Performed by: INTERNAL MEDICINE

## 2021-09-09 PROCEDURE — 027034Z DILATION OF CORONARY ARTERY, ONE ARTERY WITH DRUG-ELUTING INTRALUMINAL DEVICE, PERCUTANEOUS APPROACH: ICD-10-PCS | Performed by: INTERNAL MEDICINE

## 2021-09-09 PROCEDURE — 25010000002 BH (CUPID ONLY) ADENOSINE 6 MG/100ML MIXTURE: Performed by: INTERNAL MEDICINE

## 2021-09-09 PROCEDURE — 93010 ELECTROCARDIOGRAM REPORT: CPT | Performed by: INTERNAL MEDICINE

## 2021-09-09 PROCEDURE — 25010000002 HEPARIN (PORCINE) PER 1000 UNITS: Performed by: INTERNAL MEDICINE

## 2021-09-09 PROCEDURE — B2151ZZ FLUOROSCOPY OF LEFT HEART USING LOW OSMOLAR CONTRAST: ICD-10-PCS | Performed by: INTERNAL MEDICINE

## 2021-09-09 PROCEDURE — C9600 PERC DRUG-EL COR STENT SING: HCPCS | Performed by: INTERNAL MEDICINE

## 2021-09-09 PROCEDURE — 84484 ASSAY OF TROPONIN QUANT: CPT | Performed by: EMERGENCY MEDICINE

## 2021-09-09 PROCEDURE — 93005 ELECTROCARDIOGRAM TRACING: CPT

## 2021-09-09 PROCEDURE — 85379 FIBRIN DEGRADATION QUANT: CPT | Performed by: EMERGENCY MEDICINE

## 2021-09-09 PROCEDURE — 93005 ELECTROCARDIOGRAM TRACING: CPT | Performed by: EMERGENCY MEDICINE

## 2021-09-09 PROCEDURE — 02C03ZZ EXTIRPATION OF MATTER FROM CORONARY ARTERY, ONE ARTERY, PERCUTANEOUS APPROACH: ICD-10-PCS | Performed by: INTERNAL MEDICINE

## 2021-09-09 PROCEDURE — 85347 COAGULATION TIME ACTIVATED: CPT

## 2021-09-09 PROCEDURE — 25010000002 FENTANYL CITRATE (PF) 50 MCG/ML SOLUTION: Performed by: INTERNAL MEDICINE

## 2021-09-09 PROCEDURE — 25010000002 MIDAZOLAM PER 1 MG: Performed by: INTERNAL MEDICINE

## 2021-09-09 PROCEDURE — 99222 1ST HOSP IP/OBS MODERATE 55: CPT | Performed by: INTERNAL MEDICINE

## 2021-09-09 PROCEDURE — C1894 INTRO/SHEATH, NON-LASER: HCPCS | Performed by: INTERNAL MEDICINE

## 2021-09-09 PROCEDURE — 99152 MOD SED SAME PHYS/QHP 5/>YRS: CPT | Performed by: INTERNAL MEDICINE

## 2021-09-09 PROCEDURE — 80061 LIPID PANEL: CPT | Performed by: INTERNAL MEDICINE

## 2021-09-09 PROCEDURE — 84484 ASSAY OF TROPONIN QUANT: CPT

## 2021-09-09 PROCEDURE — 25010000002 EPTIFIBATIDE PER 5 MG: Performed by: INTERNAL MEDICINE

## 2021-09-09 PROCEDURE — 85025 COMPLETE CBC W/AUTO DIFF WBC: CPT

## 2021-09-09 DEVICE — XIENCE SIERRA™ EVEROLIMUS ELUTING CORONARY STENT SYSTEM 3.00 MM X 28 MM / RAPID-EXCHANGE
Type: IMPLANTABLE DEVICE | Status: FUNCTIONAL
Brand: XIENCE SIERRA™

## 2021-09-09 RX ORDER — SODIUM CHLORIDE 9 MG/ML
INJECTION, SOLUTION INTRAVENOUS CONTINUOUS PRN
Status: COMPLETED | OUTPATIENT
Start: 2021-09-09 | End: 2021-09-09

## 2021-09-09 RX ORDER — LIDOCAINE HYDROCHLORIDE 20 MG/ML
INJECTION, SOLUTION INFILTRATION; PERINEURAL AS NEEDED
Status: DISCONTINUED | OUTPATIENT
Start: 2021-09-09 | End: 2021-09-09 | Stop reason: HOSPADM

## 2021-09-09 RX ORDER — EPTIFIBATIDE 0.75 MG/ML
INJECTION, SOLUTION INTRAVENOUS CONTINUOUS PRN
Status: COMPLETED | OUTPATIENT
Start: 2021-09-09 | End: 2021-09-09

## 2021-09-09 RX ORDER — HEPARIN SODIUM 1000 [USP'U]/ML
INJECTION, SOLUTION INTRAVENOUS; SUBCUTANEOUS AS NEEDED
Status: DISCONTINUED | OUTPATIENT
Start: 2021-09-09 | End: 2021-09-09 | Stop reason: HOSPADM

## 2021-09-09 RX ORDER — ASPIRIN 325 MG
325 TABLET ORAL ONCE
Status: DISCONTINUED | OUTPATIENT
Start: 2021-09-09 | End: 2021-09-09 | Stop reason: SDUPTHER

## 2021-09-09 RX ORDER — MORPHINE SULFATE 2 MG/ML
4 INJECTION, SOLUTION INTRAMUSCULAR; INTRAVENOUS EVERY 4 HOURS PRN
Status: DISCONTINUED | OUTPATIENT
Start: 2021-09-09 | End: 2021-09-10 | Stop reason: HOSPADM

## 2021-09-09 RX ORDER — ATORVASTATIN CALCIUM 20 MG/1
40 TABLET, FILM COATED ORAL NIGHTLY
Status: DISCONTINUED | OUTPATIENT
Start: 2021-09-09 | End: 2021-09-10 | Stop reason: HOSPADM

## 2021-09-09 RX ORDER — SODIUM CHLORIDE 0.9 % (FLUSH) 0.9 %
10 SYRINGE (ML) INJECTION AS NEEDED
Status: DISCONTINUED | OUTPATIENT
Start: 2021-09-09 | End: 2021-09-10 | Stop reason: HOSPADM

## 2021-09-09 RX ORDER — MORPHINE SULFATE 2 MG/ML
2 INJECTION, SOLUTION INTRAMUSCULAR; INTRAVENOUS EVERY 4 HOURS PRN
Status: DISCONTINUED | OUTPATIENT
Start: 2021-09-09 | End: 2021-09-10 | Stop reason: HOSPADM

## 2021-09-09 RX ORDER — CLOPIDOGREL BISULFATE 75 MG/1
TABLET ORAL AS NEEDED
Status: DISCONTINUED | OUTPATIENT
Start: 2021-09-09 | End: 2021-09-09 | Stop reason: HOSPADM

## 2021-09-09 RX ORDER — EPTIFIBATIDE 20 MG/10ML
180 INJECTION INTRAVENOUS ONCE
Status: DISCONTINUED | OUTPATIENT
Start: 2021-09-09 | End: 2021-09-09 | Stop reason: SDUPTHER

## 2021-09-09 RX ORDER — FLUOXETINE HYDROCHLORIDE 20 MG/1
40 CAPSULE ORAL DAILY
Status: DISCONTINUED | OUTPATIENT
Start: 2021-09-09 | End: 2021-09-10 | Stop reason: HOSPADM

## 2021-09-09 RX ORDER — CLOPIDOGREL BISULFATE 75 MG/1
600 TABLET ORAL ONCE
Status: DISCONTINUED | OUTPATIENT
Start: 2021-09-09 | End: 2021-09-09 | Stop reason: SDUPTHER

## 2021-09-09 RX ORDER — CLOPIDOGREL BISULFATE 75 MG/1
75 TABLET ORAL DAILY
Status: DISCONTINUED | OUTPATIENT
Start: 2021-09-10 | End: 2021-09-10 | Stop reason: HOSPADM

## 2021-09-09 RX ORDER — FENTANYL CITRATE 50 UG/ML
INJECTION, SOLUTION INTRAMUSCULAR; INTRAVENOUS AS NEEDED
Status: DISCONTINUED | OUTPATIENT
Start: 2021-09-09 | End: 2021-09-09 | Stop reason: HOSPADM

## 2021-09-09 RX ORDER — EPTIFIBATIDE 0.75 MG/ML
2 INJECTION, SOLUTION INTRAVENOUS CONTINUOUS
Status: DISPENSED | OUTPATIENT
Start: 2021-09-09 | End: 2021-09-10

## 2021-09-09 RX ORDER — ACETAMINOPHEN 325 MG/1
650 TABLET ORAL EVERY 4 HOURS PRN
Status: DISCONTINUED | OUTPATIENT
Start: 2021-09-09 | End: 2021-09-10 | Stop reason: HOSPADM

## 2021-09-09 RX ORDER — MIDAZOLAM HYDROCHLORIDE 1 MG/ML
INJECTION INTRAMUSCULAR; INTRAVENOUS AS NEEDED
Status: DISCONTINUED | OUTPATIENT
Start: 2021-09-09 | End: 2021-09-09 | Stop reason: HOSPADM

## 2021-09-09 RX ADMIN — ATORVASTATIN CALCIUM 40 MG: 20 TABLET, FILM COATED ORAL at 20:01

## 2021-09-09 RX ADMIN — MORPHINE SULFATE 2 MG: 2 INJECTION, SOLUTION INTRAMUSCULAR; INTRAVENOUS at 18:14

## 2021-09-09 RX ADMIN — FLUOXETINE HYDROCHLORIDE 40 MG: 20 CAPSULE ORAL at 18:15

## 2021-09-09 RX ADMIN — MORPHINE SULFATE 2 MG: 2 INJECTION, SOLUTION INTRAMUSCULAR; INTRAVENOUS at 16:14

## 2021-09-09 RX ADMIN — EPTIFIBATIDE 2 MCG/KG/MIN: 0.75 INJECTION INTRAVENOUS at 19:29

## 2021-09-09 RX ADMIN — MORPHINE SULFATE 4 MG: 2 INJECTION, SOLUTION INTRAMUSCULAR; INTRAVENOUS at 22:25

## 2021-09-09 RX ADMIN — EPTIFIBATIDE 18360 MCG: 2 INJECTION, SOLUTION INTRAVENOUS at 15:28

## 2021-09-09 NOTE — ED TRIAGE NOTES
Patient to er from home per ems with c/o chest pain that stated this am around 0600. Patient received 2 nitro per ems and 324mg of asa in route. Reported the nitro did help a little. Patient is still having left sided chest pain while in triage. Patient has mask on in triage along with staff.

## 2021-09-09 NOTE — ED PROVIDER NOTES
EMERGENCY DEPARTMENT ENCOUNTER    Room Number:  2214/1  Date seen:  9/9/2021  PCP: Sangeeta Nation APRN  Historian: Patient      HPI:  Chief Complaint: Chest pain  A complete HPI/ROS/PMH/PSH/SH/FH are unobtainable due to: Nothing  Context: Navin Hare is a 45 y.o. male who presents to the ED c/o chest pain.  Patient reports that the pain started around 6:00 this morning, shortly after he had woken up.  He described as a left-sided pain that then radiated to the center of his chest.  He had associated sweating but denies associated nausea, vomiting, shortness of breath.  The pain has mostly subsided now.  He denies ever having had similar symptoms in the past.  He denies history of hypertension, hyperlipidemia, diabetes.  He does not see a doctor regularly but last saw his doctor about 2 years ago.  He denies history of smoking, but reports secondhand smoke exposure as a child.  He drinks socially.  He denies family history of coronary disease.  He denies pain with exertion or shortness of breath with exertion.  He denies history of blood clots.  No recent long car rides or immobilization.  He works as a  here locally.  He is originally from [facility].          PAST MEDICAL HISTORY  Active Ambulatory Problems     Diagnosis Date Noted   • Renal hematoma, left 03/14/2019   • NAIF (acute kidney injury) (CMS/HCC) 03/14/2019   • Elevated BP without diagnosis of hypertension 03/14/2019     Resolved Ambulatory Problems     Diagnosis Date Noted   • No Resolved Ambulatory Problems     Past Medical History:   Diagnosis Date   • Kidney stones          PAST SURGICAL HISTORY  Past Surgical History:   Procedure Laterality Date   • EAR TUBES           FAMILY HISTORY  History reviewed. No pertinent family history.      SOCIAL HISTORY  Social History     Socioeconomic History   • Marital status:      Spouse name: Not on file   • Number of children: Not on file   • Years of education: Not on  file   • Highest education level: Not on file   Tobacco Use   • Smoking status: Never Smoker   • Smokeless tobacco: Never Used   Substance and Sexual Activity   • Alcohol use: Yes     Alcohol/week: 2.0 standard drinks     Types: 2 Cans of beer per week   • Drug use: No   • Sexual activity: Defer         ALLERGIES  Patient has no known allergies.        REVIEW OF SYSTEMS  Review of Systems   Review of all 14 systems is negative other than stated in the HPI above.      PHYSICAL EXAM  ED Triage Vitals [09/09/21 0727]   Temp Heart Rate Resp BP SpO2   98.9 °F (37.2 °C) 60 20 108/58 100 %      Temp src Heart Rate Source Patient Position BP Location FiO2 (%)   Temporal -- -- -- --         GENERAL: Awake and alert, no acute distress  HENT: nares patent  EYES: no scleral icterus  CV: regular rhythm, normal rate  RESPIRATORY: normal effort, lungs clear to auscultation bilaterally  ABDOMEN: soft, nondistended, nontender throughout  MUSCULOSKELETAL: no deformity, no calf tenderness bilaterally  NEURO: alert, moves all extremities, follows commands  PSYCH:  calm, cooperative  SKIN: warm, dry    Vital signs and nursing notes reviewed.          LAB RESULTS  Recent Results (from the past 24 hour(s))   ECG 12 Lead    Collection Time: 09/09/21  7:40 AM   Result Value Ref Range    QT Interval 458 ms   Comprehensive Metabolic Panel    Collection Time: 09/09/21  7:57 AM    Specimen: Blood   Result Value Ref Range    Glucose 143 (H) 65 - 99 mg/dL    BUN 22 (H) 6 - 20 mg/dL    Creatinine 0.92 0.76 - 1.27 mg/dL    Sodium 138 136 - 145 mmol/L    Potassium 3.6 3.5 - 5.2 mmol/L    Chloride 102 98 - 107 mmol/L    CO2 22.5 22.0 - 29.0 mmol/L    Calcium 8.6 8.6 - 10.5 mg/dL    Total Protein 6.6 6.0 - 8.5 g/dL    Albumin 4.20 3.50 - 5.20 g/dL    ALT (SGPT) 49 (H) 1 - 41 U/L    AST (SGOT) 25 1 - 40 U/L    Alkaline Phosphatase 78 39 - 117 U/L    Total Bilirubin 0.5 0.0 - 1.2 mg/dL    eGFR Non African Amer 88 >60 mL/min/1.73    Globulin 2.4 gm/dL     A/G Ratio 1.8 g/dL    BUN/Creatinine Ratio 23.9 7.0 - 25.0    Anion Gap 13.5 5.0 - 15.0 mmol/L   Troponin    Collection Time: 09/09/21  7:57 AM    Specimen: Blood   Result Value Ref Range    Troponin T <0.010 0.000 - 0.030 ng/mL   Green Top (Gel)    Collection Time: 09/09/21  7:57 AM   Result Value Ref Range    Extra Tube Hold for add-ons.    Lavender Top    Collection Time: 09/09/21  7:57 AM   Result Value Ref Range    Extra Tube hold for add-on    Gold Top - SST    Collection Time: 09/09/21  7:57 AM   Result Value Ref Range    Extra Tube Hold for add-ons.    Light Blue Top    Collection Time: 09/09/21  7:57 AM   Result Value Ref Range    Extra Tube hold for add-on    CBC Auto Differential    Collection Time: 09/09/21  7:57 AM    Specimen: Blood   Result Value Ref Range    WBC 6.22 3.40 - 10.80 10*3/mm3    RBC 5.69 4.14 - 5.80 10*6/mm3    Hemoglobin 15.9 13.0 - 17.7 g/dL    Hematocrit 48.9 37.5 - 51.0 %    MCV 85.9 79.0 - 97.0 fL    MCH 27.9 26.6 - 33.0 pg    MCHC 32.5 31.5 - 35.7 g/dL    RDW 13.8 12.3 - 15.4 %    RDW-SD 43.2 37.0 - 54.0 fl    MPV 8.7 6.0 - 12.0 fL    Platelets 206 140 - 450 10*3/mm3    Neutrophil % 68.6 42.7 - 76.0 %    Lymphocyte % 20.1 19.6 - 45.3 %    Monocyte % 6.1 5.0 - 12.0 %    Eosinophil % 3.1 0.3 - 6.2 %    Basophil % 1.0 0.0 - 1.5 %    Immature Grans % 1.1 (H) 0.0 - 0.5 %    Neutrophils, Absolute 4.27 1.70 - 7.00 10*3/mm3    Lymphocytes, Absolute 1.25 0.70 - 3.10 10*3/mm3    Monocytes, Absolute 0.38 0.10 - 0.90 10*3/mm3    Eosinophils, Absolute 0.19 0.00 - 0.40 10*3/mm3    Basophils, Absolute 0.06 0.00 - 0.20 10*3/mm3    Immature Grans, Absolute 0.07 (H) 0.00 - 0.05 10*3/mm3    nRBC 0.0 0.0 - 0.2 /100 WBC       Ordered the above labs and reviewed the results.        RADIOLOGY  XR Chest 2 View    Result Date: 9/9/2021  XR CHEST 2 VW-  HISTORY:  Chest pain.  COMPARISON:  None  FINDINGS:  2 views of the chest were obtained.  Support Devices:  None. Cardiac Silhouette/Mediastinum/Krystal:  The  cardiac silhouette is upper normal to mildly enlarged. Mediastinal and hilar contours are not significantly changed. Lungs/Pleural Spaces:  There are low lung volumes. There is no focal consolidation or effusion. There is an approximately 0.7 cm nodular opacity projecting over the left lung apex, which is incompletely characterized but may reflect a pulmonary nodule. Chest Wall/Diaphragm/Upper Abdomen:  There is multilevel degenerative disc disease.   CONCLUSION(S):   1.  No focal consolidation or effusion. 2.  Approximately 0.7 cm nodular opacity projecting over the left lung apex is incompletely characterized but may reflect a pulmonary nodule. Recommend follow-up CT chest.  This report was finalized on 9/9/2021 8:32 AM by Dr. Bessie Kaplan M.D.            Ordered the above noted radiological studies. Reviewed by me in PACS.            PROCEDURES  Critical Care  Performed by: Alejandro Howell MD  Authorized by: Alejandro Howell MD     Critical care provider statement:     Critical care time (minutes):  30    Critical care time was exclusive of:  Separately billable procedures and treating other patients    Critical care was necessary to treat or prevent imminent or life-threatening deterioration of the following conditions:  Cardiac failure    Critical care was time spent personally by me on the following activities:  Discussions with consultants, evaluation of patient's response to treatment, examination of patient, obtaining history from patient or surrogate, ordering and review of laboratory studies, ordering and review of radiographic studies and pulse oximetry                  MEDICATIONS GIVEN IN ER  Medications   sodium chloride 0.9 % flush 10 mL ( Intravenous MAR Unhold 9/9/21 1526)   aspirin tablet 325 mg ( Oral MAR Unhold 9/9/21 1526)   FLUoxetine (PROzac) capsule 40 mg (has no administration in time range)   acetaminophen (TYLENOL) tablet 650 mg (has no administration in time range)    atorvastatin (LIPITOR) tablet 40 mg (has no administration in time range)   Eptifibatide (INTEGRILIN) injection 18,360 mcg (has no administration in time range)   eptifibatide (INTEGRILIN) 75 mg in 100 mL solution (2 mcg/kg/min × 102 kg Intravenous Currently Infusing 9/9/21 1527)   clopidogrel (PLAVIX) tablet 600 mg (has no administration in time range)     And   clopidogrel (PLAVIX) tablet 75 mg (has no administration in time range)   sodium chloride 0.9 % infusion (100 mL/hr Intravenous New Bag 9/9/21 1323)   eptifibatide (INTEGRILIN) 75 mg in 100 mL solution (2 mcg/kg/min × 102 kg Intravenous New Bag 9/9/21 1417)                   MEDICAL DECISION MAKING, PROGRESS, and CONSULTS    All labs have been independently reviewed by me.  All radiology studies have been reviewed by me and discussed with radiologist dictating the report.   EKG's independently viewed and interpreted by me.  Discussion below represents my analysis of pertinent findings related to patient's condition, differential diagnosis, treatment plan and final disposition.      Differential diagnosis includes but is not limited to:  Acute coronary syndrome  Costochondritis  Intercostal strain  Pneumothorax  Pulmonary embolus      ED Course as of Sep 09 1528   Thu Sep 09, 2021   0950 EKG          EKG time: 740  Rhythm/Rate: Sinus bradycardia, 51  P waves and IA: Normal  QRS, axis: Normal axis  ST and T waves: Nonspecific T wave changes in lead II with biphasic T waves in leads V4, T wave inversion in V5 and V6    Interpreted Contemporaneously by me, independently viewed  No prior tracing for comparison          [JR]   0952 Repeat EKG          EKG time: 836  Rhythm/Rate: Sinus bradycardia, 53  P waves and IA: Normal  QRS, axis: Normal axis  ST and T waves: Nonspecific T wave changes in leads I, T wave flattening in lead II, subtle T wave inversions in V4 through V6    Interpreted Contemporaneously by me, independently viewed  Similar compared to prior  earlier this morning          [JR]   0953 HEART score 2.  EKG with nonspecific T wave changes and Tn negative.  CXR without pneumothorax or mediastinal widening to suggest dissection.  Low risk for PE by Well's score, clinical STEVEN whyte.        [JR]   1022 D-Dimer, Quant: <0.27 [JR]   1104 Troponin T(!!): 0.061 [JR]   1231 Dr. Candelario, cardiology, evaluating patient at bedside.  Will plan to have patient undergo left heart cath today.     [JR]      ED Course User Index  [JR] Alejandro Howell MD     Patient received aspirin from EMS prior to arrival.  Cardiology did not want any further medications given in the emergency department but have elected to take him to the Cath Lab urgently for further evaluation of NSTEMI.  Patient was informed of incidental finding of left upper lobe pulmonary nodule today and the need for outpatient CT with his primary care provider.         I wore a mask, face shield, and gloves during this patient encounter.  Patient also wearing a surgical mask.  Hand hygeine performed before and after seeing the patient.    DIAGNOSIS  Final diagnoses:   NSTEMI (non-ST elevated myocardial infarction) (CMS/[facility])         DISPOSITION  Sent to Cath Lab            Latest Documented Vital Signs:  As of 15:28 EDT  BP- 149/88 HR- 77 Temp- 98.9 °F (37.2 °C) (Temporal) O2 sat- (!) 89%        --    Please note that portions of this were completed with a voice recognition program.          Alejandro Howell MD  09/09/21 8645

## 2021-09-09 NOTE — PLAN OF CARE
Goal Outcome Evaluation:  Plan of Care Reviewed With: patient        Progress: no change  Outcome Summary: TR band to right radial site, in place. No hematoma noted. Released 2ml but had to replace d/t ozzing. VSS. Dull chest pain, Dr. Hough aware, Morphine ordered. Stated the second dose of morphine seemed to help. Monitor vital signs, labs, and Right radial site.

## 2021-09-09 NOTE — H&P
Cardiology History & Physical    Patient Name: Navin Hare  Age/Sex: 45 y.o. male  : 1975  MRN: [id number]    Date of Admission: 2021  Date of Encounter Visit: 21  Encounter Provider: All Candelario MD  Referring Provider: No ref. provider found  Place of Service: [facility] CARDIOLOGY  Patient Care Team:  Sangeeta Nation APRN as PCP - General (Family Medicine)          Subjective:     Chief Complaint: Chest Pain    History of Present Illness:  Navin Hare is a 45 y.o. male without significant medical history who presented to the [facility] ED this morning with complaints of left-sided chest pain radiating into the center of his chest that started around 06:00AM. He had associated diaphoresis but denies any other symptoms. Denies family history of coronary disease. He works as a . He received 2 nitro and 325mg ASA per EMS en route with some relief.     Vitals stable, EKG sinus bradycardia, rate of 51 with nonspecific T wave changes in lead II with biphasic T waves in leads V4, T wave inversion in V5 and V6. Repeat EKG showed nonspecific T wave changes in leads I, T wave flattening in lead II, subtle T wave inversions in V4 through V6. CXR showed 0.7 cm nodular opacity projecting over the left lung apex which may be a pulmonary nodule. Initial troponin negative, repeat is elevated a 0.061.  Patient said at peak his pain was 7 out of 10.  He says it has significantly improved but still remains a 2 out of 10.        Past Medical History:  Past Medical History:   Diagnosis Date   • Kidney stones        Past Surgical History:   Procedure Laterality Date   • EAR TUBES         Home Medications:   Medications Prior to Admission   Medication Sig Dispense Refill Last Dose   • BUPROPION HCL PO Take 150 mg by mouth Daily.      • BUSPIRONE HCL PO Take 10 mg by mouth Every 12 (Twelve) Hours.      • FLUoxetine (PROzac) 40 MG capsule  Take 40 mg by mouth Daily.          Allergies:  No Known Allergies    Past Social History:  Social History     Socioeconomic History   • Marital status:      Spouse name: Not on file   • Number of children: Not on file   • Years of education: Not on file   • Highest education level: Not on file   Tobacco Use   • Smoking status: Never Smoker   • Smokeless tobacco: Never Used   Substance and Sexual Activity   • Alcohol use: Yes     Alcohol/week: 2.0 standard drinks     Types: 2 Cans of beer per week   • Drug use: No   • Sexual activity: Defer       Past Family History: History reviewed. No pertinent family history.   History reviewed. No pertinent family history.    Review of Systems   All other systems reviewed and are negative.          Objective:     Objective:  Temp:  [98.9 °F (37.2 °C)] 98.9 °F (37.2 °C)  Heart Rate:  [51-77] 77  Resp:  [16-20] 16  BP: (103-149)/(58-92) 149/88  No intake or output data in the 24 hours ending 09/09/21 1322  Body mass index is 30.52 kg/m².      09/09/21  0753   Weight: 102 kg (225 lb)           Physical Exam:   Vitals reviewed.   Constitutional:       Appearance: Well-developed.   Eyes:      Conjunctiva/sclera: Conjunctivae normal.   HENT:      Head: Normocephalic.   Pulmonary:      Breath sounds: Normal breath sounds.   Cardiovascular:      Normal rate. Regular rhythm.   Abdominal:      General: Bowel sounds are normal.      Palpations: Abdomen is soft.   Musculoskeletal: Normal range of motion.      Cervical back: Normal range of motion. Skin:     General: Skin is warm and dry.   Neurological:      Mental Status: Alert and oriented to person, place, and time.   Psychiatric:         Behavior: Behavior normal.          Labs:   Lab Review:     Results from last 7 days   Lab Units 09/09/21  0757   SODIUM mmol/L 138   POTASSIUM mmol/L 3.6   CHLORIDE mmol/L 102   CO2 mmol/L 22.5   BUN mg/dL 22*   CREATININE mg/dL 0.92   GLUCOSE mg/dL 143*   CALCIUM mg/dL 8.6   AST (SGOT) U/L 25    ALT (SGPT) U/L 49*     Results from last 7 days   Lab Units 09/09/21  1243 09/09/21  0956 09/09/21  0757   TROPONIN T ng/mL 0.436* 0.061* <0.010     Results from last 7 days   Lab Units 09/09/21  0757   WBC 10*3/mm3 6.22   HEMOGLOBIN g/dL 15.9   HEMATOCRIT % 48.9   PLATELETS 10*3/mm3 206                                       EKG:             Assessment:       * No active hospital problems. *        Plan:      Patient presents with chest discomfort acute onset.  He is a  for [employer].  He has a physical job and said last week he started exercising.  He now has chest discomfort that awoke him from sleep at 6 AM peaking at 7 out of 10 it is now a 2 out of 10.  His troponin is minimally got up his ECG shows T wave abnormalities and possible ST elevation in the lateral leads.  With his ongoing chest pain his abnormal troponin and his abnormal ECG most appropriate approach would be directly to go to the cardiac catheterization lab.  Risk and benefits were explained and we will proceed.    Thank you for allowing me to participate in the care of Navin Hare. Feel free to contact me directly with any further questions or concerns.    All Candelario MD  [facility]  09/09/21  13:22 EDT

## 2021-09-10 VITALS
WEIGHT: 225 LBS | RESPIRATION RATE: 18 BRPM | HEART RATE: 75 BPM | TEMPERATURE: 98.4 F | SYSTOLIC BLOOD PRESSURE: 140 MMHG | HEIGHT: 72 IN | OXYGEN SATURATION: 98 % | BODY MASS INDEX: 30.48 KG/M2 | DIASTOLIC BLOOD PRESSURE: 86 MMHG

## 2021-09-10 LAB
ACT BLD: 208 SECONDS (ref 82–152)
ACT BLD: 334 SECONDS (ref 82–152)
ACT BLD: 412 SECONDS (ref 82–152)
ANION GAP SERPL CALCULATED.3IONS-SCNC: 7.4 MMOL/L (ref 5–15)
BUN SERPL-MCNC: 16 MG/DL (ref 6–20)
BUN/CREAT SERPL: 19 (ref 7–25)
CALCIUM SPEC-SCNC: 8.2 MG/DL (ref 8.6–10.5)
CHLORIDE SERPL-SCNC: 98 MMOL/L (ref 98–107)
CO2 SERPL-SCNC: 23.6 MMOL/L (ref 22–29)
CREAT SERPL-MCNC: 0.84 MG/DL (ref 0.76–1.27)
GFR SERPL CREATININE-BSD FRML MDRD: 98 ML/MIN/1.73
GLUCOSE SERPL-MCNC: 94 MG/DL (ref 65–99)
POTASSIUM SERPL-SCNC: 3.5 MMOL/L (ref 3.5–5.2)
QT INTERVAL: 363 MS
QT INTERVAL: 370 MS
SODIUM SERPL-SCNC: 129 MMOL/L (ref 136–145)
TROPONIN T SERPL-MCNC: 1.95 NG/ML (ref 0–0.03)
TROPONIN T SERPL-MCNC: 2.38 NG/ML (ref 0–0.03)

## 2021-09-10 PROCEDURE — 93010 ELECTROCARDIOGRAM REPORT: CPT | Performed by: INTERNAL MEDICINE

## 2021-09-10 PROCEDURE — 84484 ASSAY OF TROPONIN QUANT: CPT | Performed by: INTERNAL MEDICINE

## 2021-09-10 PROCEDURE — 84484 ASSAY OF TROPONIN QUANT: CPT | Performed by: NURSE PRACTITIONER

## 2021-09-10 PROCEDURE — 93005 ELECTROCARDIOGRAM TRACING: CPT | Performed by: INTERNAL MEDICINE

## 2021-09-10 PROCEDURE — 25010000002 MORPHINE PER 10 MG: Performed by: INTERNAL MEDICINE

## 2021-09-10 PROCEDURE — 99239 HOSP IP/OBS DSCHRG MGMT >30: CPT | Performed by: NURSE PRACTITIONER

## 2021-09-10 PROCEDURE — 25010000002 EPTIFIBATIDE PER 5 MG: Performed by: INTERNAL MEDICINE

## 2021-09-10 PROCEDURE — 80048 BASIC METABOLIC PNL TOTAL CA: CPT | Performed by: INTERNAL MEDICINE

## 2021-09-10 RX ORDER — ATORVASTATIN CALCIUM 40 MG/1
40 TABLET, FILM COATED ORAL NIGHTLY
Qty: 30 TABLET | Refills: 3 | Status: SHIPPED | OUTPATIENT
Start: 2021-09-10 | End: 2021-10-26 | Stop reason: SDUPTHER

## 2021-09-10 RX ORDER — ASPIRIN 81 MG/1
81 TABLET ORAL DAILY
Qty: 30 TABLET | Refills: 11 | Status: SHIPPED | OUTPATIENT
Start: 2021-09-10 | End: 2021-10-26 | Stop reason: SDUPTHER

## 2021-09-10 RX ORDER — ASPIRIN 81 MG/1
81 TABLET ORAL DAILY
Status: DISCONTINUED | OUTPATIENT
Start: 2021-09-10 | End: 2021-09-10 | Stop reason: HOSPADM

## 2021-09-10 RX ORDER — NITROGLYCERIN 0.4 MG/1
TABLET SUBLINGUAL
Qty: 25 TABLET | Refills: 1 | Status: SHIPPED | OUTPATIENT
Start: 2021-09-10

## 2021-09-10 RX ORDER — CLOPIDOGREL BISULFATE 75 MG/1
75 TABLET ORAL DAILY
Qty: 30 TABLET | Refills: 11 | Status: SHIPPED | OUTPATIENT
Start: 2021-09-11 | End: 2021-10-26 | Stop reason: SDUPTHER

## 2021-09-10 RX ADMIN — FLUOXETINE HYDROCHLORIDE 40 MG: 20 CAPSULE ORAL at 09:19

## 2021-09-10 RX ADMIN — CLOPIDOGREL 75 MG: 75 TABLET, FILM COATED ORAL at 09:19

## 2021-09-10 RX ADMIN — MORPHINE SULFATE 4 MG: 2 INJECTION, SOLUTION INTRAMUSCULAR; INTRAVENOUS at 06:14

## 2021-09-10 RX ADMIN — METOPROLOL TARTRATE 25 MG: 25 TABLET, FILM COATED ORAL at 11:16

## 2021-09-10 RX ADMIN — ASPIRIN 81 MG: 81 TABLET, COATED ORAL at 11:16

## 2021-09-10 RX ADMIN — EPTIFIBATIDE 2 MCG/KG/MIN: 0.75 INJECTION INTRAVENOUS at 02:39

## 2021-09-10 NOTE — CONSULTS
Met with patient, discussed benefits of cardiac rehab. Provided phase II information along with the contact information for cardiac rehab here at [facility]. Will call patient after discharge to see if interested in attending, is  not sure at this time.

## 2021-09-10 NOTE — PROGRESS NOTES
"Clinical Pharmacy Services: Post PCI Discharge Medication Counseling    Navin Hare was counseled over post-PCI medications prior to discharge.    Counseling points included the followin) DAPT with Clopidogrel/ASA. Indication, patient's need for the medication, and dosing/frequency  2) Enforced the importance of taking DAPT as instructed every day  3) Explained possible side effects of being on DAPT, including increased risk of bleeding, s/sx of bleeding, and increase in cold intolerance. Also talked about ways to control bleeding for minor cuts and scrapes.  4) Discussed all important drug interactions, including over-the-counter medications and supplements (\"G\" supplements, fish oil, green tea, Sherif's Wort, and Vitamin E).     Patient also started on metoprolol tartrate 25 mg BID and atorvastatin 40 mg.Talked about patient's need for medications in light of stent placement, dosing/frequency, and importance of taking medication at night. Talked about interactions, including interaction with grapefruit juice, and using alcohol in moderation.     Explained risk for thrombosis on new stent (especially in the first 3-6 months) and medication compliance. Patient also discharged on nitroglycerin sublingual tablets. Explained how to take and when to call 911.     Patient expressed understanding and had no further questions.      Of note: Patient is opted in for [facility], however, is to  prescriptions at the pharmacy upon leaving due to needing to use [employer].     Briseida Conway, PharmD   PGY-1 Pharmacy Resident    Phone #: [phone]      "

## 2021-09-10 NOTE — PLAN OF CARE
Goal Outcome Evaluation:              Shift Summary:  Pt  VSS, SR 70s-80s, BP 130s-140s, pt on room air. Pt had left heart cath on 9/9 and TR band was removed at 23:20, gauze and tegaderm on site and is clean, dry, and intact. Pt has Integrilin running at 2 mcg/kg/min. Pt has had c/o chest pressure related to surgery and I gave 4mg morphine per orders (See MAR), WCTM.

## 2021-09-10 NOTE — PLAN OF CARE
Goal Outcome Evaluation:  Plan of Care Reviewed With: patient        Progress: improving  Outcome Summary: Outcome goal met

## 2021-09-10 NOTE — PROGRESS NOTES
[facility] Clinical Pharmacy Services: National Cardiology Data Registry (NCDR) Medication Review    Navin Hare is s/p PCI with drug-eluting stent placement for NSTEMI. Pharmacy to review discharge medications to make sure appropriate medications have been prescribed.    Patient has been discharged on the following:  · P2Y12 Inhibitor: clopidogrel 75 mg daily  · Aspirin 81 mg daily   · High Intensity Statin: atorvastatin 40 mg  Daily   · Beta-blocker: metoprolol tartrate 25 mg BID    These medications meet the requirements for NCDR discharge medication for chest pain and MI.    Briseida Conway, PharmD   PGY-1 Pharmacy Resident    Phone #: [phone]

## 2021-09-10 NOTE — DISCHARGE SUMMARY
Patient Name: Navin Hare  :1975  45 y.o.    Date of Admit: 2021  Date of Discharge:  9/10/2021    Discharge Diagnosis:  Problems Addressed this Visit        Cardiac and Vasculature    NSTEMI (non-ST elevated myocardial infarction) (CMS/[facility]) - Primary    Relevant Medications    clopidogrel (PLAVIX) 75 MG tablet (Start on 2021)    metoprolol tartrate (LOPRESSOR) 25 MG tablet    nitroglycerin (NITROSTAT) 0.4 MG SL tablet      Other Visit Diagnoses     S/P drug eluting coronary stent placement        Relevant Orders    Ambulatory Referral to Cardiac Rehab    Ambulatory Referral to Cardiac Rehab      Diagnoses       Codes Comments    NSTEMI (non-ST elevated myocardial infarction) (CMS/[facility])    -  Primary ICD-10-CM: I21.4  ICD-9-CM: 410.70     S/P drug eluting coronary stent placement     ICD-10-CM: Z95.5  ICD-9-CM: V45.82       1.  Non-ST elevation MI  2.  Status post drug-eluting stent to the second marginal branch  3.  Essential hypertension  4.  EF 50 to 55%    Hospital Course:     The patient is a 45-year-old male who is a  for [employer].  He presented to the ER on the  with complaints of chest discomfort.  He had some sinus bradycardia on his EKG with some nonspecific T waves in the inferior lateral leads.  His initial troponin was negative and the follow-up was elevated and his chest pain continued with a peak of 7/10 but with aspirin and nitro came down to 2 out of 10.    We decided to take him to the cardiac Cath Lab where he had the following procedure:    Left Main   The left main coronary artery is normal.   Left Anterior Descending   Possible segment of the LAD is mildly calcified. There is no critical stenosis identified. The mid segment of the artery is narrowed approximately 60 to 70%. The distal portion appears to be normal. The diagonal branches are normal.   Left Circumflex   The left circumflex coronary artery is a large codominant  vessel. There is mild proximal calcification. The artery is normal. The small first obtuse marginal branch arising from the proximal segment is normal. There is a large second obtuse marginal branch arising from the mid segment that is subtotally occluded with a 99% stenosis. There are 2 terminal marginal branches that are large and normal.   Second Obtuse Marginal Branch   2nd Mrg lesion is 99% stenosed. Culprit lesion. RAUL flow is 1. The lesion is type B1.   Right Coronary Artery   Right coronary artery the right coronary artery is a large codominant vessel that is normal. The posterior descending branch is large and normal.   Intervention  Number of interventions: 2  2nd Mrg lesion   Angioplasty   Angioplasty using a drug-coated balloon was performed prior to stent deployment. The balloon used was a BALN [id number] RX [id number].   Stent   Drug-eluting stent was successfully placed.   Post-Intervention Lesion Assessment   Post-intervention RAUL flow is 3. At this lesion, thrombus occurred. There was thrombus noted in a terminal branch of the circumflex marginal.   There is a 0% residual stenosis post intervention.     His EF on LV gram was 50 to 55%. He had a little discomfort in the chest last night and his troponin was elevated at 4 AM.  However we suspect this is most likely in relationship to his non-STEMI.  His symptoms were atypical from what he had had when he came in.  Repeat troponin is trending down and his EKG is stable.  I discussed with him the need for dual antiplatelet therapy uninterrupted for a year.  I also started him on a baby aspirin 81 mg a day and low-dose beta-blocker as his heart rate is now in the 80s and 90s.  His blood pressure was also elevated today.  I discussed with him his instructions and restrictions and he can return to work in 1 week.  He will follow-up with us in the office next week with the nurse practitioner and with the physician in a month.    Procedures  Performed  Procedure(s):  Left Heart Cath  Stent SHANNON coronary  Percutaneous Manual Thrombectomy  Coronary angiography  Left ventriculography       Consults     Date and Time Order Name Status Description    9/9/2021 11:05 AM STANLEY (on-call MD unless specified) Completed           Pertinent Test Results:   Results from last 7 days   Lab Units 09/10/21  0457 09/09/21  0757 09/09/21  0757   SODIUM mmol/L 129*   < > 138   POTASSIUM mmol/L 3.5   < > 3.6   CHLORIDE mmol/L 98   < > 102   CO2 mmol/L 23.6   < > 22.5   BUN mg/dL 16   < > 22*   CREATININE mg/dL 0.84   < > 0.92   CALCIUM mg/dL 8.2*   < > 8.6   BILIRUBIN mg/dL  --   --  0.5   ALK PHOS U/L  --   --  78   ALT (SGPT) U/L  --   --  49*   AST (SGOT) U/L  --   --  25   GLUCOSE mg/dL 94   < > 143*    < > = values in this interval not displayed.     Results from last 7 days   Lab Units 09/10/21  1012 09/10/21  0457 09/09/21  1243   TROPONIN T ng/mL 1.950* 2.380* 0.436*     @LABRCNT(bnp)@  Results from last 7 days   Lab Units 09/09/21  0757   WBC 10*3/mm3 6.22   HEMOGLOBIN g/dL 15.9   HEMATOCRIT % 48.9   PLATELETS 10*3/mm3 206             Results from last 7 days   Lab Units 09/09/21  1243   CHOLESTEROL mg/dL 185   TRIGLYCERIDES mg/dL 137   HDL CHOL mg/dL 41   LDL CHOL mg/dL 119*       Condition on Discharge: stable    Discharge Medications     Discharge Medications      New Medications      Instructions Start Date   aspirin 81 MG EC tablet   81 mg, Oral, Daily      atorvastatin 40 MG tablet  Commonly known as: LIPITOR   40 mg, Oral, Nightly      clopidogrel 75 MG tablet  Commonly known as: PLAVIX   75 mg, Oral, Daily   Start Date: September 11, 2021     metoprolol tartrate 25 MG tablet  Commonly known as: LOPRESSOR   25 mg, Oral, Every 12 Hours Scheduled      nitroglycerin 0.4 MG SL tablet  Commonly known as: NITROSTAT   place 1 under the tongue as needed for angina, may repeat every 5 minutes         Continue These Medications      Instructions Start Date   BUPROPION HCL  PO   150 mg, Oral, Daily      BUSPIRONE HCL PO   10 mg, Oral, Every 12 Hours      FLUoxetine 40 MG capsule  Commonly known as: PROzac   40 mg, Oral, Daily             Discharge Diet:     Activity at Discharge:     Discharge disposition: home    Follow-up Appointments  No future appointments.  Additional Instructions for the Follow-ups that You Need to Schedule     Ambulatory Referral to Cardiac Rehab   As directed      Ambulatory Referral to Cardiac Rehab   As directed            Test Results Pending at Discharge       ROSANNE Henry, [facility]  09/10/21  11:40 EDT    Time: Discharge 40 min  Electronically signed by ROSANNE Henry, 09/10/21, 11:40 AM EDT.

## 2021-09-11 ENCOUNTER — READMISSION MANAGEMENT (OUTPATIENT)
Dept: CALL CENTER | Facility: HOSPITAL | Age: 46
End: 2021-09-11

## 2021-09-11 NOTE — OUTREACH NOTE
Prep Survey      Responses   [facility] discharged from?  [place]   Is LACE score < 7 ?  Yes   Emergency Room discharge w/ pulse ox?  No   Eligibility  Readm Mgmt   Discharge diagnosis  NSTEM   Does the patient have one of the following disease processes/diagnoses(primary or secondary)?  Acute MI (STEMI,NSTEMI)   Does the patient have Home health ordered?  No   Is there a DME ordered?  No   Prep survey completed?  Yes          Latasha Mendiola RN

## 2021-09-13 ENCOUNTER — TELEPHONE (OUTPATIENT)
Dept: CARDIAC REHAB | Facility: HOSPITAL | Age: 46
End: 2021-09-13

## 2021-09-13 NOTE — TELEPHONE ENCOUNTER
Patient declined cardiac rehab at this time. Reports that he is planning to return to [facility]. Provided patient with contact information if he were to change his mind and want to attend.

## 2021-09-14 ENCOUNTER — READMISSION MANAGEMENT (OUTPATIENT)
Dept: CALL CENTER | Facility: HOSPITAL | Age: 46
End: 2021-09-14

## 2021-09-14 NOTE — OUTREACH NOTE
AMI Week 1 Survey      Responses   [facility]   Does the patient have one of the following disease processes/diagnoses(primary or secondary)?  Acute MI (STEMI,NSTEMI)   Week 1 attempt successful?  Yes   Call start time  1628   Call end time  1637   Discharge diagnosis  NSTEM   Meds reviewed with patient/caregiver?  Yes   Is the patient having any side effects they believe may be caused by any medication additions or changes?  No   Does the patient have all prescriptions related to this admission filled (includes statins,anticoagulants,HTN meds,anti-arrhythmia meds)  Yes   Is the patient taking all medications as directed (includes completed medication regime)?  Yes   Does the patient have a primary care provider?   Yes   Does the patient have an appointment with their PCP,cardiologist,or clinic within 7 days of discharge?  Yes   Has the patient kept scheduled appointments due by today?  N/A   Has home health visited the patient within 72 hours of discharge?  No   Psychosocial issues?  No   Did the patient receive a copy of their discharge instructions?  Yes   Nursing interventions  Reviewed instructions with patient   What is the patient's perception of their health status since discharge?  Improving   Nursing interventions  Nurse provided patient education   Is the patient/caregiver able to teach back signs and symptoms of when to call for help immediately:  Sudden chest discomfort, Sudden discomfort in arms, back, neck or jaw, Irregular or rapid heart rate   Nursing interventions  Nurse provided patient education   Is the pateint /caregiver able to teach back the importance of cardiac rehab?  No   Nursing interventions  Provided education on importance of cardiac rehab   Is the patient/caregiver able to teach back lifestyle changes to help prevent MIs  Quit smoking, Regular exercise as approved by provider   Is the patient/caregiver able to teach back ways to prevent a  second heart attack:  Take medications, Follow up with MD   If the patient is a current smoker, are they able to teach back resources for cessation?  [id number]   Is the patient/caregiver able to teach back the hierarchy of who to call/visit for symptoms/problems? PCP, Specialist, Home health nurse, Urgent Care, ED, 911  Yes   Additional teach back comments  Encouraged Fanny, 2 appts same time Friday   Week 1 call completed?  Yes   Wrap up additional comments  No questions or concerns.          Mariam Russo RN

## 2021-09-17 ENCOUNTER — OFFICE VISIT (OUTPATIENT)
Dept: CARDIOLOGY | Facility: CLINIC | Age: 46
End: 2021-09-17

## 2021-09-17 VITALS
WEIGHT: 230.4 LBS | HEIGHT: 72 IN | HEART RATE: 72 BPM | DIASTOLIC BLOOD PRESSURE: 78 MMHG | SYSTOLIC BLOOD PRESSURE: 118 MMHG | OXYGEN SATURATION: 98 % | BODY MASS INDEX: 31.21 KG/M2

## 2021-09-17 DIAGNOSIS — I25.119 CORONARY ARTERY DISEASE INVOLVING NATIVE CORONARY ARTERY OF NATIVE HEART WITH ANGINA PECTORIS (HCC): ICD-10-CM

## 2021-09-17 DIAGNOSIS — I21.4 NSTEMI (NON-ST ELEVATED MYOCARDIAL INFARCTION) (HCC): Primary | ICD-10-CM

## 2021-09-17 PROCEDURE — 99214 OFFICE O/P EST MOD 30 MIN: CPT | Performed by: NURSE PRACTITIONER

## 2021-09-17 RX ORDER — BUSPIRONE HYDROCHLORIDE 15 MG/1
1 TABLET ORAL DAILY
COMMUNITY
Start: 2021-08-11

## 2021-09-17 NOTE — PROGRESS NOTES
"    CARDIOLOGY        Patient Name: Navin Hare  :1975  Age: 45 y.o.  Primary Cardiologist: All Candelario MD  Encounter Provider:  ROSANNE Silver    Date of Service: 21          CHIEF COMPLAINT / REASON FOR OFFICE VISIT     Hospital Follow Up Visit      HISTORY OF PRESENT ILLNESS       HPI  Navin Hare is a 45 y.o. male who presents today for hospital follow-up.     Pt has a  history significant for NSTEMI, CAD, hypertension.    Review of medical records reveals patient recently hospitalized for NSTEMI.  Patient presented to the hospital with episodes of chest discomfort.  Initially his troponin was negative but then uptitrated.  His ECG revealed nonspecific T waves in the inferior lateral leads.  Cardiac catheterization revealed normal left main, mid 60-70%, left circumflex with a 99% stenosis, second marginal branch with 99% stenosis.  RCA was normal.  Patient had successful treatment with PCI and drug-eluting stent to the left circumflex as well as second obtuse marginal branch.    Patient presents today for follow-up.  He reports that he has not had any subsequent episodes of chest discomfort since discharge.  He is compliant with all medications and has not missed any doses of DAPT with clopidogrel and aspirin.  He is currently on a walking and running regimen.  He plans to start cardiac rehab.  Denies any dyspnea, dyspnea with exertion, palpitations, lightheadedness, lower extremity edema, fatigue.      The following portions of the patient's history were reviewed and updated as appropriate: allergies, current medications, past family history, past medical history, past social history, past surgical history and problem list.      VITAL SIGNS     Visit Vitals  /78 (BP Location: Left arm, Patient Position: Sitting)   Pulse 72   Ht 182.9 cm (72\")   Wt 105 kg (230 lb 6.4 oz)   SpO2 98%   BMI 31.25 kg/m²         Wt Readings from Last 3 Encounters:   21 105 kg (230 lb 6.4 " oz)   09/09/21 102 kg (225 lb)   03/16/19 100 kg (220 lb 9.6 oz)     Body mass index is 31.25 kg/m².      REVIEW OF SYSTEMS   Review of Systems   Constitutional: Negative for chills, fever, weight gain and weight loss.   Cardiovascular: Negative for leg swelling.   Respiratory: Negative for cough, snoring and wheezing.    Hematologic/Lymphatic: Negative for bleeding problem. Does not bruise/bleed easily.   Skin: Negative for color change.   Musculoskeletal: Negative for falls, joint pain and myalgias.   Gastrointestinal: Negative for melena.   Genitourinary: Negative for hematuria.   Neurological: Negative for excessive daytime sleepiness.   Psychiatric/Behavioral: Negative for depression. The patient is not nervous/anxious.            PHYSICAL EXAMINATION     Constitutional:       Appearance: Normal appearance. Well-developed.   Eyes:      Conjunctiva/sclera: Conjunctivae normal.   Neck:      Vascular: No carotid bruit.   Pulmonary:      Effort: Pulmonary effort is normal.      Breath sounds: Normal breath sounds.   Cardiovascular:      Normal rate. Regular rhythm. Normal S1. Normal S2.      Murmurs: There is no murmur.      No gallop. No click. No rub.      Comments: Right radial cath site well healed without erythema or ecchymosis, palpable proximal and distal pulses, good capillary refill, good motor function of hand, no thrill or bruit detected, site is soft and non-tender with no hematoma, no sensory deficits noted.    Musculoskeletal: Normal range of motion. Skin:     General: Skin is warm and dry.   Neurological:      Mental Status: Alert and oriented to person, place, and time.      GCS: GCS eye subscore is 4. GCS verbal subscore is 5. GCS motor subscore is 6.   Psychiatric:         Speech: Speech normal.         Behavior: Behavior normal.         Thought Content: Thought content normal.         Judgment: Judgment normal.           REVIEWED DATA     Procedures    Cardiac Procedures:  1.  Cardiac  catheterization 9/9/2021 revealed normal left main, mid 60-70%, left circumflex with a 99% stenosis, second marginal branch with 99% stenosis.  RCA was normal.  Patient had successful treatment with PCI and drug-eluting stent to the left circumflex as well as second obtuse marginal branch    Lipid Panel    Lipid Panel 9/9/21   Total Cholesterol 185   Triglycerides 137   HDL Cholesterol 41   VLDL Cholesterol 25   LDL Cholesterol  119 (A)   LDL/HDL Ratio 2.84   (A) Abnormal value                ASSESSMENT & PLAN      Diagnosis Plan   1. NSTEMI (non-ST elevated myocardial infarction) (CMS/[facility])     2. Coronary artery disease involving native coronary artery of native heart with angina pectoris (CMS/[facility])           SUMMARY/DISCUSSION  1. Recent NSTEMI/CAD.  Patient with recent hospitalization for NSTEMI.  Found to have 2 lesions that were successfully treated with PCI and SHANNON.  Patient currently on uninterrupted DAPT with aspirin and complete agreeable.  Stressed importance of not missing any doses for 1 year.  He is also on guideline directed medical therapy with atorvastatin, metoprolol tartrate.  He has not had any recurrence of symptoms.  He will be reaching out to cardiac rehab to schedule evaluation appointment.  2. No changes made to patient's regimen.  He was told that he can return to work.  He will call the office for any new or worsening symptoms.  Keep previously scheduled appointment with Dr. Candelario for follow-up.        MEDICATIONS         Discharge Medications          Accurate as of September 17, 2021 12:14 PM. If you have any questions, ask your nurse or doctor.            Continue These Medications      Instructions Start Date   Aspirin Adult Low Strength 81 MG EC tablet  Generic drug: aspirin   81 mg, Oral, Daily      atorvastatin 40 MG tablet  Commonly known as: LIPITOR   40 mg, Oral, Nightly      BUPROPION HCL PO   150 mg, Oral, Daily      busPIRone 15 MG tablet  Commonly known as: BUSPAR   1 tablet,  Oral, Daily      clopidogrel 75 MG tablet  Commonly known as: PLAVIX   75 mg, Oral, Daily      FLUoxetine 40 MG capsule  Commonly known as: PROzac   40 mg, Oral, Daily      metoprolol tartrate 25 MG tablet  Commonly known as: LOPRESSOR   25 mg, Oral, Every 12 Hours Scheduled      nitroglycerin 0.4 MG SL tablet  Commonly known as: NITROSTAT   place 1 under the tongue as needed for angina, may repeat every 5 minutes                 **Dragon Disclaimer:   Much of this encounter note is an electronic transcription/translation of spoken language to printed text. The electronic translation of spoken language may permit erroneous, or at times, nonsensical words or phrases to be inadvertently transcribed. Although I have reviewed the note for such errors, some may still exist.

## 2021-09-22 ENCOUNTER — READMISSION MANAGEMENT (OUTPATIENT)
Dept: CALL CENTER | Facility: HOSPITAL | Age: 46
End: 2021-09-22

## 2021-09-22 NOTE — OUTREACH NOTE
AMI Week 2 Survey      Responses   [facility] patient discharged from?  [place]   Does the patient have one of the following disease processes/diagnoses(primary or secondary)?  Acute MI (STEMI,NSTEMI)   Week 2 attempt successful?  Yes   Call start time  1414   Call end time  1415   Discharge diagnosis  NSTEM   Is the patient taking all medications as directed (includes completed medication regime)?  Yes   Has the patient kept scheduled appointments due by today?  Yes   Has home health visited the patient within 72 hours of discharge?  N/A   Psychosocial issues?  No   What is the patient's perception of their health status since discharge?  Improving   Nursing interventions  Nurse provided patient education   Is the patient/caregiver able to teach back signs and symptoms of when to call for help immediately:  Sudden chest discomfort, Sudden discomfort in arms, back, neck or jaw, Shortness of breath at any time, Sudden sweating or clammy skin, Nausea or vomiting, Dizziness or lightheadedness, Irregular or rapid heart rate   Nursing interventions  Nurse provided patient education   Is the patient/caregiver able to teach back ways to prevent a second heart attack:  Take medications, Follow up with MD   Is the patient/caregiver able to teach back the hierarchy of who to call/visit for symptoms/problems? PCP, Specialist, Home health nurse, Urgent Care, ED, 911  Yes   Week 2 call completed?  Yes   Revoked  No further contact(revokes)-requires comment   Is the patient interested in additional calls from an ambulatory ?  NOTE:  applies to high risk patients requiring additional follow-up.  No   Graduated/Revoked comments  Says he is back to work and doing well, recently saw his cardiologist, no need for further calls.          Luisa Gibson RN

## 2021-10-11 ENCOUNTER — TELEPHONE (OUTPATIENT)
Dept: CARDIOLOGY | Facility: CLINIC | Age: 46
End: 2021-10-11

## 2021-10-11 NOTE — TELEPHONE ENCOUNTER
Patient called to change his pharmacy to [facility] on [address] and to find out his refill status.     (done)

## 2021-10-26 ENCOUNTER — OFFICE VISIT (OUTPATIENT)
Dept: CARDIOLOGY | Facility: CLINIC | Age: 46
End: 2021-10-26

## 2021-10-26 VITALS
HEIGHT: 72 IN | DIASTOLIC BLOOD PRESSURE: 88 MMHG | SYSTOLIC BLOOD PRESSURE: 120 MMHG | OXYGEN SATURATION: 98 % | HEART RATE: 61 BPM | BODY MASS INDEX: 31.42 KG/M2 | WEIGHT: 232 LBS

## 2021-10-26 DIAGNOSIS — I21.4 NSTEMI (NON-ST ELEVATED MYOCARDIAL INFARCTION) (HCC): Primary | ICD-10-CM

## 2021-10-26 PROCEDURE — 99213 OFFICE O/P EST LOW 20 MIN: CPT | Performed by: INTERNAL MEDICINE

## 2021-10-28 RX ORDER — ATORVASTATIN CALCIUM 40 MG/1
40 TABLET, FILM COATED ORAL NIGHTLY
Qty: 90 TABLET | Refills: 3 | Status: SHIPPED | OUTPATIENT
Start: 2021-10-28

## 2021-10-28 RX ORDER — ASPIRIN 81 MG/1
81 TABLET ORAL DAILY
Qty: 90 TABLET | Refills: 3 | Status: SHIPPED | OUTPATIENT
Start: 2021-10-28 | End: 2022-12-27

## 2021-10-28 RX ORDER — CLOPIDOGREL BISULFATE 75 MG/1
75 TABLET ORAL DAILY
Qty: 90 TABLET | Refills: 3 | Status: SHIPPED | OUTPATIENT
Start: 2021-10-28 | End: 2022-12-27

## 2021-10-28 NOTE — PROGRESS NOTES
"      CARDIOLOGY    lAl Candelario MD    ENCOUNTER DATE:  10/26/2021    Navin Hare / 45 y.o. / male        CHIEF COMPLAINT / REASON FOR OFFICE VISIT     NSTEMI (non-ST elevated myocardial infarction) (09/17/2021 Follow up)      HISTORY OF PRESENT ILLNESS       HPI  Navin Hare is a 45 y.o. male who presents today for reevaluation.  Patient had an NSTEMI on 9/9/2021.  Overall he continues to improve.  He says he is back to where he was before his heart attack but he is not back to where he wants to be.  He denies chest pain shortness of breath lightheadedness swelling or fatigue.  He is currently doing rehab on his own and was discussing with what he is doing sounds like he is progressing nicely.    The following portions of the patient's history were reviewed and updated as appropriate: allergies, current medications, past family history, past medical history, past social history, past surgical history and problem list.      VITAL SIGNS     Visit Vitals  /88 (BP Location: Left arm)   Pulse 61   Ht 182.9 cm (72\")   Wt 105 kg (232 lb)   SpO2 98%   BMI 31.46 kg/m²         Wt Readings from Last 3 Encounters:   10/26/21 105 kg (232 lb)   09/17/21 105 kg (230 lb 6.4 oz)   09/09/21 102 kg (225 lb)     Body mass index is 31.46 kg/m².      REVIEW OF SYSTEMS   Review of Systems   All other systems reviewed and are negative.          PHYSICAL EXAMINATION     Vitals reviewed.   Constitutional:       Appearance: Healthy appearance.   Pulmonary:      Effort: Pulmonary effort is normal.   Cardiovascular:      Normal rate. Regular rhythm. Normal S1. Normal S2.      Murmurs: There is no murmur.      No gallop. No click. No rub.   Pulses:     Intact distal pulses.   Edema:     Peripheral edema absent.   Neurological:      Mental Status: Alert and oriented to person, place and time.           REVIEWED DATA     Procedures    Cardiac Procedures:  1.     Lipid Panel    Lipid Panel 9/9/21   Total Cholesterol 185 "   Triglycerides 137   HDL Cholesterol 41   VLDL Cholesterol 25   LDL Cholesterol  119 (A)   LDL/HDL Ratio 2.84   (A) Abnormal value                ASSESSMENT & PLAN      Diagnosis Plan   1. NSTEMI (non-ST elevated myocardial infarction) (HCC)           SUMMARY/DISCUSSION  1. History of prior microinfarction.  I did review everything with him he is on a good medical regimen.  Encouraged to continue to exercise if he has any issues we can always put him in rehab but right now he is doing good on his own.  Follow-up in 1 year sooner course if he develops any types of issues.  His cath site looked excellent.  Also renewed all his medications.        MEDICATIONS         Discharge Medications          Accurate as of October 26, 2021 11:59 PM. If you have any questions, ask your nurse or doctor.            Continue These Medications      Instructions Start Date   Aspirin Adult Low Strength 81 MG EC tablet  Generic drug: aspirin   81 mg, Oral, Daily      atorvastatin 40 MG tablet  Commonly known as: LIPITOR   40 mg, Oral, Nightly      BUPROPION HCL PO   150 mg, Oral, Daily      busPIRone 15 MG tablet  Commonly known as: BUSPAR   1 tablet, Oral, Daily      clopidogrel 75 MG tablet  Commonly known as: PLAVIX   75 mg, Oral, Daily      FLUoxetine 40 MG capsule  Commonly known as: PROzac   40 mg, Oral, Daily      metoprolol tartrate 25 MG tablet  Commonly known as: LOPRESSOR   25 mg, Oral, Every 12 Hours Scheduled      nitroglycerin 0.4 MG SL tablet  Commonly known as: NITROSTAT   place 1 under the tongue as needed for angina, may repeat every 5 minutes                 **Dragon Disclaimer:   Much of this encounter note is an electronic transcription/translation of spoken language to printed text. The electronic translation of spoken language may permit erroneous, or at times, nonsensical words or phrases to be inadvertently transcribed. Although I have reviewed the note for such errors, some may still exist.

## 2021-11-10 ENCOUNTER — TELEPHONE (OUTPATIENT)
Dept: CARDIOLOGY | Facility: CLINIC | Age: 46
End: 2021-11-10

## 2021-11-10 NOTE — TELEPHONE ENCOUNTER
Pt called and LVM wanting to know what is okay for him to take in place of ibuprofen. Please advise. // HG

## 2021-11-10 NOTE — TELEPHONE ENCOUNTER
Pt called back and would specifically like to know what the difference between the two are as he thought they were the same thing.

## 2022-11-20 NOTE — PROGRESS NOTES
Subjective:     Encounter Date:11/21/2022      Patient ID: Navin Hare is a 46 y.o. male.    Chief Complaint:follow up NSTEMI  History of Present Illness  This is a 45 y/o man who follows with Dr. Candelario and is new to me today. He has a pmhx of NSTEMI, coronary artery disease and hypertension. He was hospitalized for a NSTEMI in 2021 and underwent a cardiac catheterization that showed a normal left main, mid LAD with 60-70% stenosis, left circumflex with 99% stenosis, second marginal branch with 99% stenosis and a normal RCA. He received drug-eluting stent placement to the left circumflex and second obtuse marginal branch.     He was last seen in the office by Dr. Candelario in October 2021 and was doing well. He was instructed to follow up in 1 year.    He is here today for his follow up visit. He is doing well with no complaints of chest pain, shortness of breath or palpitations. He denies any dizziness or syncope. He denies any significant swelling in his lower extremities, orthopnea or PND. He participates in [employer] about every other day and runs on his days off. He is slowly easing back in to this routine. His diet is pretty good and he is avoiding fast food. Blood pressure is pretty well controlled.    I have reviewed and updated as appropriate allergies, current medications, past family history, past medical history, past surgical history and problem list.    Review of Systems   Constitutional: Negative for fever, malaise/fatigue, weight gain and weight loss.   HENT: Negative for congestion, hoarse voice and sore throat.    Eyes: Negative for blurred vision and double vision.   Cardiovascular: Negative for chest pain, dyspnea on exertion, leg swelling, orthopnea, palpitations and syncope.   Respiratory: Negative for cough, shortness of breath and wheezing.    Gastrointestinal: Negative for abdominal pain, hematemesis, hematochezia and melena.   Genitourinary: Negative for dysuria and hematuria.    Neurological: Negative for dizziness, headaches, light-headedness and numbness.   Psychiatric/Behavioral: Negative for depression. The patient is not nervous/anxious.          Current Outpatient Medications:   •  aspirin 81 MG EC tablet, Take 1 tablet by mouth Daily., Disp: 90 tablet, Rfl: 3  •  atorvastatin (LIPITOR) 40 MG tablet, Take 1 tablet by mouth Every Night., Disp: 90 tablet, Rfl: 3  •  BUPROPION HCL PO, Take 150 mg by mouth Daily., Disp: , Rfl:   •  busPIRone (BUSPAR) 15 MG tablet, Take 1 tablet by mouth Daily., Disp: , Rfl:   •  clopidogrel (PLAVIX) 75 MG tablet, Take 1 tablet by mouth Daily., Disp: 90 tablet, Rfl: 3  •  FLUoxetine (PROzac) 40 MG capsule, Take 40 mg by mouth Daily., Disp: , Rfl:   •  metoprolol tartrate (LOPRESSOR) 25 MG tablet, Take 1 tablet by mouth Every 12 (Twelve) Hours., Disp: 90 tablet, Rfl: 3  •  nitroglycerin (NITROSTAT) 0.4 MG SL tablet, place 1 under the tongue as needed for angina, may repeat every 5 minutes, Disp: 25 tablet, Rfl: 1    Past Medical History:   Diagnosis Date   • Kidney stones        Past Surgical History:   Procedure Laterality Date   • CARDIAC CATHETERIZATION N/A 9/9/2021    Procedure: Left Heart Cath;  Surgeon: Manuel Hough MD;  Location: [facility] INVASIVE LOCATION;  Service: Cardiovascular;  Laterality: N/A;   • CARDIAC CATHETERIZATION N/A 9/9/2021    Procedure: Stent SHANNON coronary;  Surgeon: Manuel Hough MD;  Location: [facility] CATH INVASIVE LOCATION;  Service: Cardiovascular;  Laterality: N/A;   • CARDIAC CATHETERIZATION  9/9/2021    Procedure: Percutaneous Manual Thrombectomy;  Surgeon: Manuel Hough MD;  Location: [facility] CATH INVASIVE LOCATION;  Service: Cardiovascular;;   • CARDIAC CATHETERIZATION N/A 9/9/2021    Procedure: Coronary angiography;  Surgeon: Manuel Hough MD;  Location: [facility] CATH INVASIVE LOCATION;  Service: Cardiovascular;  Laterality: N/A;   • CARDIAC CATHETERIZATION N/A 9/9/2021    Procedure: Left  "ventriculography;  Surgeon: Manuel Hough MD;  Location: [facility] CATH INVASIVE LOCATION;  Service: Cardiovascular;  Laterality: N/A;   • EAR TUBES         No family history on file.    Social History     Tobacco Use   • Smoking status: Never   • Smokeless tobacco: Never   Substance Use Topics   • Alcohol use: Not Currently     Alcohol/week: 2.0 standard drinks     Types: 2 Cans of beer per week   • Drug use: No         ECG 12 Lead    Date/Time: 11/21/2022 10:51 AM  Performed by: Renata Starr APRN  Authorized by: Renata Starr APRN   Comparison: compared with previous ECG from 9/10/2021  Similar to previous ECG  Rhythm: sinus rhythm  Q waves: III and aVF    Comments: No significant change from previous EKG               Objective:     Visit Vitals  /80 (BP Location: Left arm, Patient Position: Sitting, Cuff Size: Large Adult)   Pulse 70   Resp 16   Ht 182.9 cm (72.01\")   Wt 113 kg (249 lb)   SpO2 98%   BMI 33.76 kg/m²             Physical Exam     Lab Review:         Cardiac Procedures:   1. Cardiac catheterization 9/9/21:  Diagnostic  Dominance: Right  Number of lesions: 1  Left Main   The left main coronary artery is normal.      Left Anterior Descending   Possible segment of the LAD is mildly calcified. There is no critical stenosis identified. The mid segment of the artery is narrowed approximately 60 to 70%. The distal portion appears to be normal. The diagonal branches are normal.      Left Circumflex   The left circumflex coronary artery is a large codominant vessel. There is mild proximal calcification. The artery is normal. The small first obtuse marginal branch arising from the proximal segment is normal. There is a large second obtuse marginal branch arising from the mid segment that is subtotally occluded with a 99% stenosis. There are 2 terminal marginal branches that are large and normal.      Second Obtuse Marginal Branch   2nd Mrg lesion is 99% stenosed. Culprit lesion. RAUL flow is " 1. The lesion is type B1.      Right Coronary Artery   Right coronary artery the right coronary artery is a large codominant vessel that is normal. The posterior descending branch is large and normal.      Intervention  Number of interventions: 2   2nd Mrg lesion   Angioplasty   Angioplasty using a drug-coated balloon was performed prior to stent deployment. The balloon used was a BALN TREK RX [id number].   Stent   Drug-eluting stent was successfully placed.   Post-Intervention Lesion Assessment   Post-intervention RAUL flow is 3. At this lesion, thrombus occurred. There was thrombus noted in a terminal branch of the circumflex marginal.   There is a 0% residual stenosis post intervention.        Left Heart Findings    Left Ventricle The overall size of the left ventricle appears to be normal.  The global contractility of the ventricle is within normal limits.  Estimated ejection fraction is 50 to 55%.     2.     Assessment:         Diagnoses and all orders for this visit:    1. Elevated BP without diagnosis of hypertension (Primary)    2. NSTEMI (non-ST elevated myocardial infarction) ([facility])    3. NAIF (acute kidney injury) ([facility])            Plan:       1. CAD: s/p PCI with stent placement to left circumflex and 2nd obtuse marginal branch in 2019. On aspirin, statin, clopidogrel and metoprolol. EKG is stable with no ischemic changes. No anginal symptoms reported. Will continue with current medical therapy.  2. HTN: Blood pressure is pretty well controlled. No changes.  3. HLD: on statin therapy. Managed by PCP. No recent lipid panel, will order. Goal LDL< 70    Thank you for allowing me to participate in this patient's care. Please call with any questions or concerns. Mr. Hare will follow up with Dr. Candelario in 1 year.          Your medication list          Accurate as of November 21, 2022 10:38 AM. If you have any questions, ask your nurse or doctor.            CONTINUE taking these medications      Instructions Last  Dose Given Next Dose Due   aspirin 81 MG EC tablet      Take 1 tablet by mouth Daily.       atorvastatin 40 MG tablet  Commonly known as: LIPITOR      Take 1 tablet by mouth Every Night.       BUPROPION HCL PO      Take 150 mg by mouth Daily.       busPIRone 15 MG tablet  Commonly known as: BUSPAR      Take 1 tablet by mouth Daily.       clopidogrel 75 MG tablet  Commonly known as: PLAVIX      Take 1 tablet by mouth Daily.       FLUoxetine 40 MG capsule  Commonly known as: PROzac      Take 40 mg by mouth Daily.       metoprolol tartrate 25 MG tablet  Commonly known as: LOPRESSOR      Take 1 tablet by mouth Every 12 (Twelve) Hours.       nitroglycerin 0.4 MG SL tablet  Commonly known as: NITROSTAT      place 1 under the tongue as needed for angina, may repeat every 5 minutes                Renata Starr, ROSANNE  11/21/22  2:38 PM EST

## 2022-11-21 ENCOUNTER — OFFICE VISIT (OUTPATIENT)
Dept: CARDIOLOGY | Facility: CLINIC | Age: 47
End: 2022-11-21

## 2022-11-21 VITALS
BODY MASS INDEX: 33.72 KG/M2 | OXYGEN SATURATION: 98 % | DIASTOLIC BLOOD PRESSURE: 80 MMHG | HEART RATE: 70 BPM | HEIGHT: 72 IN | WEIGHT: 249 LBS | RESPIRATION RATE: 16 BRPM | SYSTOLIC BLOOD PRESSURE: 134 MMHG

## 2022-11-21 DIAGNOSIS — I21.4 NSTEMI (NON-ST ELEVATED MYOCARDIAL INFARCTION): ICD-10-CM

## 2022-11-21 DIAGNOSIS — E78.5 HYPERLIPIDEMIA LDL GOAL <70: ICD-10-CM

## 2022-11-21 DIAGNOSIS — N17.9 AKI (ACUTE KIDNEY INJURY): ICD-10-CM

## 2022-11-21 DIAGNOSIS — R03.0 ELEVATED BP WITHOUT DIAGNOSIS OF HYPERTENSION: Primary | ICD-10-CM

## 2022-11-21 PROCEDURE — 99214 OFFICE O/P EST MOD 30 MIN: CPT | Performed by: NURSE PRACTITIONER

## 2022-11-21 PROCEDURE — 93000 ELECTROCARDIOGRAM COMPLETE: CPT | Performed by: NURSE PRACTITIONER

## 2022-12-27 RX ORDER — ASPIRIN 81 MG/1
TABLET ORAL
Qty: 90 TABLET | Refills: 3 | Status: SHIPPED | OUTPATIENT
Start: 2022-12-27

## 2022-12-27 RX ORDER — CLOPIDOGREL BISULFATE 75 MG/1
TABLET ORAL
Qty: 90 TABLET | Refills: 3 | Status: SHIPPED | OUTPATIENT
Start: 2022-12-27

## 2024-02-06 RX ORDER — ATORVASTATIN CALCIUM 40 MG/1
40 TABLET, FILM COATED ORAL NIGHTLY
Qty: 90 TABLET | Refills: 3 | Status: SHIPPED | OUTPATIENT
Start: 2024-02-06

## (undated) DEVICE — CATH DIAG IMPULSE FL3.5 6F 100CM

## (undated) DEVICE — TREK CORONARY DILATATION CATHETER 2.25 MM X 15 MM / RAPID-EXCHANGE: Brand: TREK

## (undated) DEVICE — HI-TORQUE EXTRA S'PORT GUIDE WIRE .014 STRAIGHT TIP 3.0 CM X 190 CM: Brand: HI-TORQUE EXTRA S'PORT

## (undated) DEVICE — TR BAND RADIAL ARTERY COMPRESSION DEVICE: Brand: TR BAND

## (undated) DEVICE — CATH ASPIR EXPORTADVANCE 6F .014IN 140CM

## (undated) DEVICE — CATH VENT MIV RADL PIG ST TIP 5F 110CM

## (undated) DEVICE — 6F .070 JL3.5 100CM: Brand: CORDIS

## (undated) DEVICE — PK CATH CARD 40

## (undated) DEVICE — GW EMR FIX EXCHG J STD .035 3MM 260CM

## (undated) DEVICE — TREK CORONARY DILATATION CATHETER 3.0 MM X 15 MM / RAPID-EXCHANGE: Brand: TREK

## (undated) DEVICE — KT MANIFLD CARDIAC

## (undated) DEVICE — GLIDESHEATH SLENDER STAINLESS STEEL KIT: Brand: GLIDESHEATH SLENDER

## (undated) DEVICE — TREK CORONARY DILATATION CATHETER 3.0 MM X 25 MM / RAPID-EXCHANGE: Brand: TREK

## (undated) DEVICE — DEV INDEFLATOR P/N 580289

## (undated) DEVICE — CATH DIAG IMPULSE FR4 6F 100CM